# Patient Record
Sex: MALE | Race: WHITE | Employment: UNEMPLOYED | URBAN - METROPOLITAN AREA
[De-identification: names, ages, dates, MRNs, and addresses within clinical notes are randomized per-mention and may not be internally consistent; named-entity substitution may affect disease eponyms.]

---

## 2018-12-04 ENCOUNTER — HOSPITAL ENCOUNTER (OUTPATIENT)
Dept: SLEEP CENTER | Age: 53
Discharge: HOME | End: 2018-12-04
Payer: COMMERCIAL

## 2018-12-04 PROCEDURE — 95811 POLYSOM 6/>YRS CPAP 4/> PARM: CPT

## 2018-12-04 PROCEDURE — 2700000000 HC OXYGEN THERAPY PER DAY

## 2020-01-08 RX ORDER — POTASSIUM CHLORIDE 750 MG/1
20 CAPSULE, EXTENDED RELEASE ORAL 2 TIMES DAILY
Qty: 180 CAPSULE | Refills: 3 | Status: SHIPPED | OUTPATIENT
Start: 2020-01-08 | End: 2020-01-09

## 2020-01-08 RX ORDER — METOLAZONE 2.5 MG/1
2.5 TABLET ORAL DAILY
Qty: 90 TABLET | Refills: 3 | Status: SHIPPED
Start: 2020-01-08 | End: 2021-03-12

## 2020-01-08 RX ORDER — ATORVASTATIN CALCIUM 40 MG/1
40 TABLET, FILM COATED ORAL NIGHTLY
Qty: 90 TABLET | Refills: 3 | Status: SHIPPED
Start: 2020-01-08 | End: 2021-02-17 | Stop reason: SDUPTHER

## 2020-01-08 RX ORDER — AMLODIPINE BESYLATE 10 MG/1
10 TABLET ORAL DAILY
Qty: 90 TABLET | Refills: 3 | Status: SHIPPED
Start: 2020-01-08 | End: 2021-02-17 | Stop reason: SDUPTHER

## 2020-01-08 RX ORDER — METOPROLOL TARTRATE 100 MG/1
100 TABLET ORAL 3 TIMES DAILY
Qty: 270 TABLET | Refills: 3 | Status: SHIPPED
Start: 2020-01-08 | End: 2021-06-10 | Stop reason: SDUPTHER

## 2020-01-09 RX ORDER — POTASSIUM CHLORIDE 750 MG/1
10 CAPSULE, EXTENDED RELEASE ORAL 2 TIMES DAILY
COMMUNITY
End: 2020-01-09 | Stop reason: SDUPTHER

## 2020-01-09 RX ORDER — POTASSIUM CHLORIDE 750 MG/1
10 CAPSULE, EXTENDED RELEASE ORAL 2 TIMES DAILY
Qty: 180 CAPSULE | Refills: 3 | Status: SHIPPED
Start: 2020-01-09 | End: 2021-02-17 | Stop reason: SDUPTHER

## 2020-02-07 VITALS
DIASTOLIC BLOOD PRESSURE: 88 MMHG | SYSTOLIC BLOOD PRESSURE: 142 MMHG | WEIGHT: 289 LBS | HEART RATE: 64 BPM | BODY MASS INDEX: 43.8 KG/M2 | HEIGHT: 68 IN

## 2020-04-23 ENCOUNTER — TELEPHONE (OUTPATIENT)
Dept: CARDIOLOGY CLINIC | Age: 55
End: 2020-04-23

## 2020-11-17 ENCOUNTER — TELEPHONE (OUTPATIENT)
Dept: CARDIOLOGY CLINIC | Age: 55
End: 2020-11-17

## 2020-11-17 RX ORDER — ISOSORBIDE MONONITRATE 30 MG/1
30 TABLET, EXTENDED RELEASE ORAL DAILY
Qty: 90 TABLET | Refills: 3 | Status: SHIPPED
Start: 2020-11-17 | End: 2021-03-12

## 2020-11-17 NOTE — TELEPHONE ENCOUNTER
Patient called in stating his blood pressure usually runs 130/75 it is now running 155/80 with some chest discomfort - he is under some stress and doesn't know if that's what's causing it. He has been going back and forth to [place] where his son got , but since pandemic has not been to his physicians. I explained that I do not have an opening until Luna but would put him on a cancellation list if he wanted. He states he will see what happens over the next few days.   If he feels he needs an appointment, he will call

## 2021-02-17 RX ORDER — ATORVASTATIN CALCIUM 40 MG/1
40 TABLET, FILM COATED ORAL NIGHTLY
Qty: 30 TABLET | Refills: 0 | Status: SHIPPED
Start: 2021-02-17 | End: 2021-03-22 | Stop reason: SDUPTHER

## 2021-02-17 RX ORDER — POTASSIUM CHLORIDE 750 MG/1
10 CAPSULE, EXTENDED RELEASE ORAL 2 TIMES DAILY
Qty: 60 CAPSULE | Refills: 0 | Status: SHIPPED
Start: 2021-02-17 | End: 2021-03-22 | Stop reason: SDUPTHER

## 2021-02-17 RX ORDER — AMLODIPINE BESYLATE 10 MG/1
10 TABLET ORAL DAILY
Qty: 30 TABLET | Refills: 0 | Status: SHIPPED
Start: 2021-02-17 | End: 2021-03-22 | Stop reason: SDUPTHER

## 2021-03-04 ENCOUNTER — HOSPITAL ENCOUNTER (OUTPATIENT)
Age: 56
Discharge: HOME | End: 2021-03-04
Payer: COMMERCIAL

## 2021-03-04 PROCEDURE — 36415 COLL VENOUS BLD VENIPUNCTURE: CPT

## 2021-03-04 PROCEDURE — 84153 ASSAY OF PSA TOTAL: CPT

## 2021-03-04 PROCEDURE — G0103 PSA SCREENING: HCPCS

## 2021-03-08 LAB
PROSTATE SPECIFIC ANTIGEN: 1.33 NG/ML (ref 0–4)
PROSTATE SPECIFIC ANTIGEN: ABNORMAL NG/ML (ref 0–4)

## 2021-03-12 ENCOUNTER — OFFICE VISIT (OUTPATIENT)
Dept: CARDIOLOGY CLINIC | Age: 56
End: 2021-03-12
Payer: COMMERCIAL

## 2021-03-12 VITALS
BODY MASS INDEX: 40.16 KG/M2 | HEIGHT: 68 IN | SYSTOLIC BLOOD PRESSURE: 110 MMHG | HEART RATE: 61 BPM | WEIGHT: 265 LBS | DIASTOLIC BLOOD PRESSURE: 60 MMHG

## 2021-03-12 DIAGNOSIS — I50.9 CONGESTIVE HEART FAILURE, UNSPECIFIED HF CHRONICITY, UNSPECIFIED HEART FAILURE TYPE (HCC): Primary | ICD-10-CM

## 2021-03-12 PROCEDURE — G8427 DOCREV CUR MEDS BY ELIG CLIN: HCPCS | Performed by: INTERNAL MEDICINE

## 2021-03-12 PROCEDURE — G8484 FLU IMMUNIZE NO ADMIN: HCPCS | Performed by: INTERNAL MEDICINE

## 2021-03-12 PROCEDURE — 99214 OFFICE O/P EST MOD 30 MIN: CPT | Performed by: INTERNAL MEDICINE

## 2021-03-12 PROCEDURE — 93000 ELECTROCARDIOGRAM COMPLETE: CPT | Performed by: INTERNAL MEDICINE

## 2021-03-12 PROCEDURE — G8417 CALC BMI ABV UP PARAM F/U: HCPCS | Performed by: INTERNAL MEDICINE

## 2021-03-12 PROCEDURE — 3017F COLORECTAL CA SCREEN DOC REV: CPT | Performed by: INTERNAL MEDICINE

## 2021-03-12 PROCEDURE — 1036F TOBACCO NON-USER: CPT | Performed by: INTERNAL MEDICINE

## 2021-03-12 NOTE — PROGRESS NOTES
[facility] Progress Note  Dr. Josh Pina      Referring Physician: Arturo Mendoza DO  CHIEF COMPLAINT:   Chief Complaint   Patient presents with    Congestive Heart Failure     Annual. Pt has no cardiac complaints today       HISTORY OF PRESENT ILLNESS:   60year old male with history of CAD, CKD, CHF, hypertension and hyperlipidemia is here for a follow up visit. Patient denies any chest pain, no shortness of breath, no lightheadedness, no dizziness, no palpitations, no pedal edema, no PND, no orthopnea, no syncope, no presyncopal episodes. Past Medical History:   Diagnosis Date    Atrial fibrillation ([address]  11-13-14    post ACB    CAD (coronary artery disease)     Carotid artery stenosis     Chronic kidney disease     Diastolic CHF ([address] 07/63/71    Echo 10/17/14 EF 20% stage I diastolic, 84/1/26  EF 45% with stage II diastolic chf    Hyperlipidemia     Hypertension     Obesity          Past Surgical History:   Procedure Laterality Date    CARDIAC CATHETERIZATION  10/31/14    severe triple vessel disease    CARDIAC SURGERY      stent x 2  in 2006 [facility]    CORONARY ANGIOPLASTY      CORONARY ARTERY BYPASS GRAFT  11-5-14    x 5    DIAGNOSTIC CARDIAC CATH LAB PROCEDURE  3/21/07    [facility]: Abnormal LV status, global hypokinesis, EF 50%. No AS, no MR. Left main luminal irregular. LAD prox 40-50% stenosis. Diag 1 prox 100% stenosis fills via collaterals from LAD. LCX prox 100% stenosis. Fills via collaterals from RCA. RCA distal 90% stenosis. PDA-right luminal irregular. RPLS prox 90% stenosis. Double vessel disease, severe. LV dysfunction mild.     DIAGNOSTIC CARDIAC CATH LAB PROCEDURE  3/22/07    stent x2, [facility]: Successful PCI of RCA, native vessel   [address]    left negative tumor         Current Outpatient Medications   Medication Sig Dispense Refill    metoprolol (LOPRESSOR) 100 MG tablet Take 1 tablet by mouth 3 times daily 270 tablet 3    aspirin 81 MG tablet Take 81 mg by mouth daily.  amLODIPine (NORVASC) 10 MG tablet Take 1 tablet by mouth daily 90 tablet 3    atorvastatin (LIPITOR) 40 MG tablet Take 1 tablet by mouth nightly 90 tablet 3    potassium chloride (MICRO-K) 10 MEQ extended release capsule Take 1 capsule by mouth 2 times daily 180 capsule 3     No current facility-administered medications for this visit.           Allergies as of 03/12/2021 - Review Complete 03/12/2021   Allergen Reaction Noted    Penicillins Anaphylaxis 04/22/2013       Social History     Socioeconomic History    Marital status:      Spouse name: Not on file    Number of children: Not on file    Years of education: Not on file    Highest education level: Not on file   Occupational History    Not on file   Social Needs    Financial resource strain: Not on file    Food insecurity     Worry: Not on file     Inability: Not on file    Transportation needs     Medical: Not on file     Non-medical: Not on file   Tobacco Use    Smoking status: Never Smoker    Smokeless tobacco: Never Used   Substance and Sexual Activity    Alcohol use: No     Comment: once or twice a month     Drug use: No    Sexual activity: Not on file   Lifestyle    Physical activity     Days per week: Not on file     Minutes per session: Not on file    Stress: Not on file   Relationships    Social connections     Talks on phone: Not on file     Gets together: Not on file     Attends Restoration service: Not on file     Active member of club or organization: Not on file     Attends meetings of clubs or organizations: Not on file     Relationship status: Not on file    Intimate partner violence     Fear of current or ex partner: Not on file     Emotionally abused: Not on file     Physically abused: Not on file     Forced sexual activity: Not on file   Other Topics Concern    Not on file   Social History Narrative    Not on file       Family History Problem Relation Age of Onset    Heart Surgery Father        REVIEW OF SYSTEMS:     CONSTITUTIONAL:  negative for  fevers, chills, sweats and fatigue  HEENT:  negative for  tinnitus, earaches, nasal congestion and epistaxis  RESPIRATORY:  negative for  dry cough, cough with sputum, dyspnea, wheezing and hemoptysis  GASTROINTESTINAL:  negative for nausea, vomiting, diarrhea, constipation, pruritus and jaundice  HEMATOLOGIC/LYMPHATIC:  negative for easy bruising, bleeding, lymphadenopathy and petechiae  ENDOCRINE:  negative for heat intolerance, cold intolerance, tremor, hair loss and diabetic symptoms including neither polyuria nor polydipsia nor blurred vision  MUSCULOSKELETAL:  negative for  myalgias, arthralgias, joint swelling, stiff joints and decreased range of motion  NEUROLOGICAL:  negative for memory problems, speech problems, visual disturbance, dysphagia, weakness and numbness      PHYSICAL EXAM:   CONSTITUTIONAL: awake, alert, cooperative, no apparent distress, and appears stated age  HEAD: normocepalic, without obvious abnormality, atraumatic  NECK: Supple, symmetrical, trachea midline, no adenopathy, thyroid symmetric  LUNGS: No increased work of breathing, good air exchange, no rales no wheezing. CARDIOVASCULAR: Normal apical impulse, regular rate and rhythm, normal S1 and S2, no S3 or S4, and no murmur noted and no JVD, no carotid bruit. + 1 pitting edema up to his thighs right more than left. ABDOMEN: Soft, nontender, no masses, no organomegaly, BS+  MUSCULOSKELETAL: No clubbing no cyanosis. there is no redness, warmth, or swelling of the joints.   NEUROLOGIC: Alert, awake,oriented x3        /60 (Site: Right Upper Arm, Position: Sitting, Cuff Size: Large Adult)   Pulse 61   Ht 5' 8\" (1.727 m)   Wt 265 lb (120.2 kg)   BMI 40.29 kg/m²     DATA:   I personally reviewed the visit EKG with the following interpretation: Rhythm, nonspecific T wave changes in the lateral leads, poor R wave progression and first-degree AV block    EKG 11/14/19 Sinus rhythm with 1st degree AV block    ECHO: 5/11/15 Summary   Compared to prior echo, no changes noted. Technically adequate study. Mild asymmetric left ventricular hypertrophy   Ejection fraction is visually estimated at 50%. Mild global wall hypokinesis. There is doppler evidence of stage III diastolic dysfunction. Right ventricle global systolic function is mildly reduced . Mild tricuspid regurgitation. RVSP was not measured. Stress Test:     Angiography: 10/31/14 IMPRESSION:  Severe triple-vessel disease. Cardiology Labs: BMP:    Lab Results   Component Value Date     10/30/2017    K 3.6 10/30/2017     10/30/2017    CO2 30 10/30/2017    BUN 19 10/30/2017    CREATININE 1.4 10/30/2017     CMP:    Lab Results   Component Value Date     10/30/2017    K 3.6 10/30/2017     10/30/2017    CO2 30 10/30/2017    BUN 19 10/30/2017    CREATININE 1.4 10/30/2017    PROT 6.8 03/20/2015     CBC:    Lab Results   Component Value Date    WBC 10.4 03/19/2015    RBC 5.19 03/19/2015    HGB 13.2 03/19/2015    HCT 41.8 03/19/2015    MCV 80.5 03/19/2015    RDW 16.5 03/19/2015     03/19/2015     PT/INR:  No results found for: PTINR  PT/INR Warfarin:  No components found for: PTPATWAR, PTINRWAR  PTT:    Lab Results   Component Value Date    APTT 28.2 11/05/2014     PTT Heparin:  No components found for: APTTHEP  Magnesium:    Lab Results   Component Value Date    MG 2.4 11/17/2014     TSH:  No results found for: TSH  TROPONIN:  No components found for: TROP  BNP:    Lab Results   Component Value Date    BNP 1,384 11/24/2014     FASTING LIPID PANEL:    Lab Results   Component Value Date    CHOL 128 10/29/2014    HDL 35 10/29/2014    TRIG 131 10/29/2014     No orders to display     I have personally reviewed the laboratory, cardiac diagnostic and radiographic testing as outlined above:      IMPRESSION:  1.   Chronic diastolic congestive heart failure  2. Hypertension: Controlled  3. Coronary artery disease status post CABG:left internal mammary artery to the LAD, saphenous vein graft to the diagonal branch of the LAD, saphenous vein graft to the posterolateral with a Y graft to the PDA, and the left radial artery to the obtuse marginal, stable would continue current treatment  4. Status post remote PCI to RCA and LAD  5.stage III chronic kidney disease  6. abnormal EKG with chronic T wave changes in the lateral leads    RECOMMENDATIONS:   1. Continue current treatment  2. CHF: Daily weight, take an extra Lasix for weight gain of more than 2-3 pounds in 24 hours, compliance with diuretics, low-salt diet were all advised. 3.  Follow-up with Dr. Melida Castillo as scheduled  4. Follow-up with Dr. Nanette Humphries in 1 year, sooner if symptomatic for any reason  I have reviewed my findings and recommendations with patient    Electronically signed by Marybel Rodriguez MD on 4/12/2021 at 8:56 PM    NOTE: This report was transcribed using voice recognition software.  Every effort was made to ensure accuracy; however, inadvertent computerized transcription errors may be present

## 2021-03-22 RX ORDER — ATORVASTATIN CALCIUM 40 MG/1
40 TABLET, FILM COATED ORAL NIGHTLY
Qty: 90 TABLET | Refills: 3 | Status: SHIPPED
Start: 2021-03-22 | End: 2021-03-31 | Stop reason: SDUPTHER

## 2021-03-22 RX ORDER — AMLODIPINE BESYLATE 10 MG/1
10 TABLET ORAL DAILY
Qty: 90 TABLET | Refills: 3 | Status: SHIPPED
Start: 2021-03-22 | End: 2021-03-31 | Stop reason: SDUPTHER

## 2021-03-22 RX ORDER — POTASSIUM CHLORIDE 750 MG/1
10 CAPSULE, EXTENDED RELEASE ORAL 2 TIMES DAILY
Qty: 180 CAPSULE | Refills: 3 | Status: SHIPPED
Start: 2021-03-22 | End: 2021-03-31 | Stop reason: SDUPTHER

## 2021-03-31 RX ORDER — POTASSIUM CHLORIDE 750 MG/1
10 CAPSULE, EXTENDED RELEASE ORAL 2 TIMES DAILY
Qty: 180 CAPSULE | Refills: 3 | Status: SHIPPED
Start: 2021-03-31 | End: 2022-06-13

## 2021-03-31 RX ORDER — AMLODIPINE BESYLATE 10 MG/1
10 TABLET ORAL DAILY
Qty: 90 TABLET | Refills: 3 | Status: SHIPPED
Start: 2021-03-31 | End: 2022-06-13

## 2021-03-31 RX ORDER — ATORVASTATIN CALCIUM 40 MG/1
40 TABLET, FILM COATED ORAL NIGHTLY
Qty: 90 TABLET | Refills: 3 | Status: SHIPPED
Start: 2021-03-31 | End: 2022-06-13

## 2021-06-10 RX ORDER — METOPROLOL TARTRATE 100 MG/1
100 TABLET ORAL 3 TIMES DAILY
Qty: 270 TABLET | Refills: 3 | Status: SHIPPED
Start: 2021-06-10 | End: 2022-06-13

## 2021-08-30 ENCOUNTER — TELEPHONE (OUTPATIENT)
Dept: CARDIOLOGY CLINIC | Age: 56
End: 2021-08-30

## 2022-05-17 NOTE — PROGRESS NOTES
[facility] Cardiology Progress Note  Dr. Yvette Nunez      Referring Physician: Charanjit Dickey DO  CHIEF COMPLAINT:   Chief Complaint   Patient presents with    Coronary Artery Disease     Annual, Patient has no complaints        HISTORY OF PRESENT ILLNESS:   60year old male with history of CAD, CKD, CHF, hypertension and hyperlipidemia is here for a follow up visit. Patient denies any chest pain, no shortness of breath, no lightheadedness, no dizziness, no palpitations, no pedal edema, no PND, no orthopnea, no syncope, no presyncopal episodes. Past Medical History:   Diagnosis Date    Atrial fibrillation ([address]  11-13-14    post ACB    CAD (coronary artery disease)     Carotid artery stenosis     Chronic kidney disease     Diastolic CHF ([address] 73/07/54    Echo 10/17/14 EF 33% stage I diastolic, 76/7/69  EF 49% with stage II diastolic chf    Hyperlipidemia     Hypertension     Obesity          Past Surgical History:   Procedure Laterality Date    CARDIAC CATHETERIZATION  10/31/14    severe triple vessel disease    CARDIAC SURGERY      stent x 2  in 2006 [facility]    CORONARY ANGIOPLASTY      CORONARY ARTERY BYPASS GRAFT  11-5-14    x 5    DIAGNOSTIC CARDIAC CATH LAB PROCEDURE  3/21/07    [facility]: Abnormal LV status, global hypokinesis, EF 50%. No AS, no MR. Left main luminal irregular. LAD prox 40-50% stenosis. Diag 1 prox 100% stenosis fills via collaterals from LAD. LCX prox 100% stenosis. Fills via collaterals from RCA. RCA distal 90% stenosis. PDA-right luminal irregular. RPLS prox 90% stenosis. Double vessel disease, severe. LV dysfunction mild.  DIAGNOSTIC CARDIAC CATH LAB PROCEDURE  3/22/07    stent x2, [facility]: Successful PCI of RCA, native vessel    ELBOW SURGERY  1969    left negative tumor         Current Outpatient Medications   Medication Sig Dispense Refill    aspirin 81 MG tablet Take 81 mg by mouth daily.       metoprolol (LOPRESSOR) 100 MG tablet Take 1 tablet by mouth 3 times daily (Patient not taking: Reported on 5/18/2022) 270 tablet 3    amLODIPine (NORVASC) 10 MG tablet Take 1 tablet by mouth daily (Patient not taking: Reported on 5/18/2022) 90 tablet 3    atorvastatin (LIPITOR) 40 MG tablet Take 1 tablet by mouth nightly (Patient not taking: Reported on 5/18/2022) 90 tablet 3    potassium chloride (MICRO-K) 10 MEQ extended release capsule Take 1 capsule by mouth 2 times daily (Patient not taking: Reported on 5/18/2022) 180 capsule 3     No current facility-administered medications for this visit. Allergies as of 05/18/2022 - Fully Reviewed 05/18/2022   Allergen Reaction Noted    Penicillins Anaphylaxis 04/22/2013       Social History     Socioeconomic History    Marital status:      Spouse name: Not on file    Number of children: Not on file    Years of education: Not on file    Highest education level: Not on file   Occupational History    Not on file   Tobacco Use    Smoking status: Never Smoker    Smokeless tobacco: Never Used   Vaping Use    Vaping Use: Never used   Substance and Sexual Activity    Alcohol use: No     Comment: once or twice a month     Drug use: No    Sexual activity: Not on file   Other Topics Concern    Not on file   Social History Narrative    Not on file     Social Determinants of Health     Financial Resource Strain:     Difficulty of Paying Living Expenses: Not on file   Food Insecurity:     Worried About [address] in the Last Year: Not on file    Nicole of Food in the Last Year: Not on file   Transportation Needs:     Lack of Transportation (Medical): Not on file    Lack of Transportation (Non-Medical):  Not on file   Physical Activity:     Days of Exercise per Week: Not on file    Minutes of Exercise per Session: Not on file   Stress:     Feeling of Stress : Not on file   Social Connections:     Frequency of Communication with Friends and Family: Not on file  Frequency of Social Gatherings with Friends and Family: Not on file    Attends Adventism Services: Not on file    Active Member of Clubs or Organizations: Not on file    Attends Club or Organization Meetings: Not on file    Marital Status: Not on file   Intimate Partner Violence:     Fear of Current or Ex-Partner: Not on file    Emotionally Abused: Not on file    Physically Abused: Not on file    Sexually Abused: Not on file   Housing Stability:     Unable to Pay for Housing in the Last Year: Not on file    Number of [place] in the Last Year: Not on file    Unstable Housing in the Last Year: Not on file       Family History   Problem Relation Age of Onset    Heart Surgery Father        REVIEW OF SYSTEMS:     CONSTITUTIONAL:  negative for  fevers, chills, sweats and fatigue  HEENT:  negative for  tinnitus, earaches, nasal congestion and epistaxis  RESPIRATORY:  negative for  dry cough, cough with sputum, dyspnea, wheezing and hemoptysis  GASTROINTESTINAL:  negative for nausea, vomiting, diarrhea, constipation, pruritus and jaundice  HEMATOLOGIC/LYMPHATIC:  negative for easy bruising, bleeding, lymphadenopathy and petechiae  ENDOCRINE:  negative for heat intolerance, cold intolerance, tremor, hair loss and diabetic symptoms including neither polyuria nor polydipsia nor blurred vision  MUSCULOSKELETAL:  negative for  myalgias, arthralgias, joint swelling, stiff joints and decreased range of motion  NEUROLOGICAL:  negative for memory problems, speech problems, visual disturbance, dysphagia, weakness and numbness      PHYSICAL EXAM:   CONSTITUTIONAL: awake, alert, cooperative, no apparent distress, and appears stated age  HEAD: normocepalic, without obvious abnormality, atraumatic  NECK: Supple, symmetrical, trachea midline, no adenopathy, thyroid symmetric  LUNGS: No increased work of breathing, good air exchange, no rales no wheezing.   CARDIOVASCULAR: Normal apical impulse, regular rate and rhythm, normal S1 and S2, no S3 or S4, and no murmur noted and no JVD, no carotid bruit. + 1 pitting edema up to his thighs right more than left. ABDOMEN: Soft, nontender, no masses, no organomegaly, BS+  MUSCULOSKELETAL: No clubbing no cyanosis. there is no redness, warmth, or swelling of the joints. NEUROLOGIC: Alert, awake,oriented x3        BP (!) 130/98   Pulse 58   Resp 16   Ht 5' 9\" (1.753 m)   Wt 231 lb (104.8 kg)   BMI 34.11 kg/m²     DATA:   I personally reviewed the visit EKG with the following interpretation: Atrial fibrillation, slow ventricular response, incomplete right bundle branch block    EKG 3/12/21  Rhythm, nonspecific T wave changes in the lateral leads, poor R wave progression and first-degree AV block    ECHO: 5/11/15 Summary   Compared to prior echo, no changes noted. Technically adequate study. Mild asymmetric left ventricular hypertrophy   Ejection fraction is visually estimated at 50%. Mild global wall hypokinesis. There is doppler evidence of stage III diastolic dysfunction. Right ventricle global systolic function is mildly reduced . Mild tricuspid regurgitation. RVSP was not measured. Stress Test:     Angiography: 10/31/14 IMPRESSION:  Severe triple-vessel disease.        Cardiology Labs: BMP:    Lab Results   Component Value Date     10/30/2017    K 3.6 10/30/2017     10/30/2017    CO2 30 10/30/2017    BUN 19 10/30/2017    CREATININE 1.4 10/30/2017     CMP:    Lab Results   Component Value Date     10/30/2017    K 3.6 10/30/2017     10/30/2017    CO2 30 10/30/2017    BUN 19 10/30/2017    CREATININE 1.4 10/30/2017    PROT 6.8 03/20/2015     CBC:    Lab Results   Component Value Date    WBC 10.4 03/19/2015    RBC 5.19 03/19/2015    HGB 13.2 03/19/2015    HCT 41.8 03/19/2015    MCV 80.5 03/19/2015    RDW 16.5 03/19/2015     03/19/2015     PT/INR:  No results found for: PTINR  PT/INR Warfarin:  No components found for: PTPATWAR, PTINRWAR  PTT: Lab Results   Component Value Date    APTT 28.2 11/05/2014     PTT Heparin:  No components found for: APTTHEP  Magnesium:    Lab Results   Component Value Date    MG 2.4 11/17/2014     TSH:  No results found for: TSH  TROPONIN:  No components found for: TROP  BNP:    Lab Results   Component Value Date    BNP 1,384 11/24/2014     FASTING LIPID PANEL:    Lab Results   Component Value Date    CHOL 128 10/29/2014    HDL 35 10/29/2014    TRIG 131 10/29/2014     No orders to display     I have personally reviewed the laboratory, cardiac diagnostic and radiographic testing as outlined above:      IMPRESSION:  1. Atrial fibrillation: Newly diagnosed, controlled ventricular response, [id number] score of 3, will benefit from chronic anticoagulation, will start Eliquis 5 mg 1 by mouth twice daily, will hold on starting the Eliquis for now due to recent right arm trauma and headache  2. Chronic diastolic congestive heart failure  3. Hypertension: Controlled  4. Coronary artery disease status post CABG:left internal mammary artery to the LAD, saphenous vein graft to the diagonal branch of the LAD, saphenous vein graft to the posterolateral with a Y graft to the PDA, and the left radial artery to the obtuse marginal, s/p remote PCI to RCA and LAD, doing fine, will continue current treatment. 5.stage III chronic kidney disease  6. abnormal EKG with chronic T wave changes in the lateral leads    RECOMMENDATIONS:   1. Preventive cardiology: Low-salt, low-cholesterol diet, daily exercise, total cholesterol of less than 200, LDL of less than 70, were all advised. 2.  Eliquis 5 mg 1 by mouth twice daily when right arm swelling resolved  3. Will discontinue aspirin when we start Eliquis  4. CHF: Daily weight, take an extra Lasix for weight gain of more than 2-3 pounds in 24 hours, compliance with diuretics, low-salt diet were all advised. 5.  Follow-up with Dr. Clara Pacheco as scheduled  6.   Follow-up with Dr. Renae Abraham in 1 year, sooner if symptomatic for any reason  I have reviewed my findings and recommendations with patient    Electronically signed by Aury Gale MD on 5/18/2022 at 10:16 AM    NOTE: This report was transcribed using voice recognition software.  Every effort was made to ensure accuracy; however, inadvertent computerized transcription errors may be present

## 2022-05-18 ENCOUNTER — OFFICE VISIT (OUTPATIENT)
Dept: CARDIOLOGY CLINIC | Age: 57
End: 2022-05-18
Payer: COMMERCIAL

## 2022-05-18 VITALS
SYSTOLIC BLOOD PRESSURE: 130 MMHG | DIASTOLIC BLOOD PRESSURE: 98 MMHG | RESPIRATION RATE: 16 BRPM | WEIGHT: 231 LBS | HEART RATE: 58 BPM | BODY MASS INDEX: 34.21 KG/M2 | HEIGHT: 69 IN

## 2022-05-18 DIAGNOSIS — I50.9 CONGESTIVE HEART FAILURE, UNSPECIFIED HF CHRONICITY, UNSPECIFIED HEART FAILURE TYPE (HCC): Primary | ICD-10-CM

## 2022-05-18 PROCEDURE — 99214 OFFICE O/P EST MOD 30 MIN: CPT | Performed by: INTERNAL MEDICINE

## 2022-05-18 PROCEDURE — 93000 ELECTROCARDIOGRAM COMPLETE: CPT | Performed by: INTERNAL MEDICINE

## 2022-05-25 ENCOUNTER — TELEPHONE (OUTPATIENT)
Dept: CARDIOLOGY CLINIC | Age: 57
End: 2022-05-25

## 2022-05-25 NOTE — TELEPHONE ENCOUNTER
Patient is going to be starting on his Eliquis but has questions:    He is taking aspirin - is he to continue?     He walks 3-4 miles daily and lifts weights asking if it's ok to continue    He is going to possibly have his lens replaced in his eyes asking if that will be ok

## 2022-05-26 NOTE — TELEPHONE ENCOUNTER
Should continue baby aspirin with foodHe should continue to stay as active as he is right nowYes he can still have his surgery, however, Eliquis should be held 3 days prior to the procedure

## 2022-06-13 ENCOUNTER — TELEPHONE (OUTPATIENT)
Dept: CARDIOLOGY CLINIC | Age: 57
End: 2022-06-13

## 2022-06-13 NOTE — TELEPHONE ENCOUNTER
Patient called in stating that since he last saw you, he hasn't felt well.   He has developed swelling in his legs - they are double the size and is having dyspnea on exertion

## 2022-06-14 RX ORDER — POTASSIUM CHLORIDE 750 MG/1
10 TABLET, EXTENDED RELEASE ORAL 2 TIMES DAILY
Qty: 90 TABLET | Refills: 1 | Status: SHIPPED | OUTPATIENT
Start: 2022-06-14 | End: 2022-08-12 | Stop reason: ALTCHOICE

## 2022-06-14 RX ORDER — BUMETANIDE 1 MG/1
1 TABLET ORAL 2 TIMES DAILY
Qty: 90 TABLET | Refills: 1 | Status: SHIPPED | OUTPATIENT
Start: 2022-06-14 | End: 2022-08-12 | Stop reason: ALTCHOICE

## 2022-06-14 NOTE — TELEPHONE ENCOUNTER
I called and Bumex 1 mg by mouth twice daily with a potassium to be taken with Bumex until swelling and weight gain are back to normal

## 2022-06-14 NOTE — TELEPHONE ENCOUNTER
Patient is calling in - states he has gained 12 pounds over the past few days - is not on a diuretic

## 2022-06-17 ENCOUNTER — TELEPHONE (OUTPATIENT)
Dept: CARDIOLOGY CLINIC | Age: 57
End: 2022-06-17

## 2022-06-17 NOTE — TELEPHONE ENCOUNTER
Chris called. Was hit by a baseball and went to urgent care. His BP there was 205/130 (they told him he may be in AF).   [phone]

## 2022-06-20 ENCOUNTER — HOSPITAL ENCOUNTER (OUTPATIENT)
Age: 57
Discharge: HOME | End: 2022-06-20
Payer: COMMERCIAL

## 2022-06-20 ENCOUNTER — OFFICE VISIT (OUTPATIENT)
Dept: CARDIOLOGY CLINIC | Age: 57
End: 2022-06-20
Payer: COMMERCIAL

## 2022-06-20 VITALS
DIASTOLIC BLOOD PRESSURE: 98 MMHG | OXYGEN SATURATION: 96 % | BODY MASS INDEX: 36.85 KG/M2 | SYSTOLIC BLOOD PRESSURE: 140 MMHG | HEIGHT: 69 IN | WEIGHT: 248.8 LBS | HEART RATE: 80 BPM | RESPIRATION RATE: 18 BRPM

## 2022-06-20 DIAGNOSIS — I50.9 CONGESTIVE HEART FAILURE, UNSPECIFIED HF CHRONICITY, UNSPECIFIED HEART FAILURE TYPE (HCC): Primary | ICD-10-CM

## 2022-06-20 DIAGNOSIS — R06.02 SOB (SHORTNESS OF BREATH): Primary | ICD-10-CM

## 2022-06-20 DIAGNOSIS — I50.9 CONGESTIVE HEART FAILURE, UNSPECIFIED HF CHRONICITY, UNSPECIFIED HEART FAILURE TYPE (HCC): ICD-10-CM

## 2022-06-20 LAB
ANION GAP SERPL CALCULATED.3IONS-SCNC: 10 MMOL/L (ref 7–16)
BUN BLDV-MCNC: 30 MG/DL (ref 6–20)
CALCIUM SERPL-MCNC: 9.1 MG/DL (ref 8.6–10.2)
CHLORIDE BLD-SCNC: 102 MMOL/L (ref 98–107)
CO2: 33 MMOL/L (ref 22–29)
CREAT SERPL-MCNC: 1.8 MG/DL (ref 0.7–1.2)
GFR AFRICAN AMERICAN: 47
GFR NON-AFRICAN AMERICAN: 39 ML/MIN/1.73
GLUCOSE BLD-MCNC: 101 MG/DL (ref 74–99)
POTASSIUM SERPL-SCNC: 3.7 MMOL/L (ref 3.5–5)
PRO-BNP: 9425 PG/ML (ref 0–125)
SODIUM BLD-SCNC: 145 MMOL/L (ref 132–146)

## 2022-06-20 PROCEDURE — 80048 BASIC METABOLIC PNL TOTAL CA: CPT

## 2022-06-20 PROCEDURE — 83880 ASSAY OF NATRIURETIC PEPTIDE: CPT

## 2022-06-20 PROCEDURE — 93000 ELECTROCARDIOGRAM COMPLETE: CPT | Performed by: INTERNAL MEDICINE

## 2022-06-20 PROCEDURE — 36415 COLL VENOUS BLD VENIPUNCTURE: CPT

## 2022-06-20 PROCEDURE — 99215 OFFICE O/P EST HI 40 MIN: CPT | Performed by: INTERNAL MEDICINE

## 2022-06-20 RX ORDER — METOPROLOL TARTRATE 100 MG/1
100 TABLET ORAL DAILY
COMMUNITY
End: 2022-08-12 | Stop reason: ALTCHOICE

## 2022-06-20 RX ORDER — METOLAZONE 5 MG/1
5 TABLET ORAL DAILY
Qty: 30 TABLET | Refills: 0 | Status: SHIPPED
Start: 2022-06-20 | End: 2022-08-09 | Stop reason: SDUPTHER

## 2022-06-20 NOTE — PROGRESS NOTES
[facility] Cardiology Progress Note  Dr. Evan Do      Referring Physician: Ketan Dyer DO  CHIEF COMPLAINT:   Chief Complaint   Patient presents with    Congestive Heart Failure     ov- pt has complaints of chest discomfort, palpitations, sob, dizziness, and swelling in feet and legs       HISTORY OF PRESENT ILLNESS:   60year old male with history of CAD, atrial fibrillation, CKD, CHF, hypertension and hyperlipidemia is here for a follow up visit. Patient is complaining of worsening pedal edema, orthopnea, shortness of breath, easy fatigability, started 2 weeks ago, has been persistent, some improvement with the p.o. Bumex, no chest pain, no lightheadedness or dizziness, no syncope, no presyncopal episodes. Stated blood pressure has been out of control    Past Medical History:   Diagnosis Date    Atrial fibrillation ([facility] [address]  11-13-14    post ACB    CAD (coronary artery disease)     Carotid artery stenosis     Chronic kidney disease     Diastolic CHF ([facility] [address] 05/30[birth year]51    Echo 10/17/14 EF 21% stage I diastolic, 69/2/31  EF 25% with stage II diastolic chf    Hyperlipidemia     Hypertension     Obesity          Past Surgical History:   Procedure Laterality Date    CARDIAC CATHETERIZATION  10/31/14    severe triple vessel disease    CARDIAC SURGERY      stent x 2  in [address] [facility]    CORONARY ANGIOPLASTY      CORONARY ARTERY BYPASS GRAFT  11-5-14    x 5    DIAGNOSTIC CARDIAC CATH LAB PROCEDURE  3/21/07    [facility]: Abnormal LV status, global hypokinesis, EF 50%. No AS, no MR. Left main luminal irregular. LAD prox 40-50% stenosis. Diag 1 prox 100% stenosis fills via collaterals from LAD. LCX prox 100% stenosis. Fills via collaterals from RCA. RCA distal 90% stenosis. PDA-right luminal irregular. RPLS prox 90% stenosis. Double vessel disease, severe. LV dysfunction mild.     DIAGNOSTIC CARDIAC CATH LAB PROCEDURE  3/22/07    stent x2, [facility]: Successful PCI of RCA, native vessel    ELBOW SURGERY  1969    left negative tumor         Current Outpatient Medications   Medication Sig Dispense Refill    metoprolol (LOPRESSOR) 100 MG tablet Take 100 mg by mouth daily      bumetanide (BUMEX) 1 MG tablet Take 1 tablet by mouth 2 times daily 90 tablet 1    potassium chloride (KLOR-CON M) 10 MEQ extended release tablet Take 1 tablet by mouth 2 times daily With the Bumex 90 tablet 1    apixaban (ELIQUIS) 5 MG TABS tablet Take 1 tablet by mouth 2 times daily 180 tablet 3    aspirin 81 MG tablet Take 81 mg by mouth daily. No current facility-administered medications for this visit. Allergies as of 06/20/2022 - Fully Reviewed 06/20/2022   Allergen Reaction Noted    Penicillins Anaphylaxis 04/22/2013       Social History     Socioeconomic History    Marital status:      Spouse name: Not on file    Number of children: Not on file    Years of education: Not on file    Highest education level: Not on file   Occupational History    Not on file   Tobacco Use    Smoking status: Never Smoker    Smokeless tobacco: Never Used   Vaping Use    Vaping Use: Never used   Substance and Sexual Activity    Alcohol use: No     Comment: once or twice a month     Drug use: No    Sexual activity: Not on file   Other Topics Concern    Not on file   Social History Narrative    Not on file     Social Determinants of Health     Financial Resource Strain:     Difficulty of Paying Living Expenses: Not on file   Food Insecurity:     Worried About [address] in the Last Year: Not on file    Nicole of Food in the Last Year: Not on file   Transportation Needs:     Lack of Transportation (Medical): Not on file    Lack of Transportation (Non-Medical):  Not on file   Physical Activity:     Days of Exercise per Week: Not on file    Minutes of Exercise per Session: Not on file   Stress:     Feeling of Stress : Not on file   Social Connections:     Frequency of Communication with Friends and Family: Not on file    Frequency of Social Gatherings with Friends and Family: Not on file    Attends Rastafari Services: Not on file    Active Member of Clubs or Organizations: Not on file    Attends Club or Organization Meetings: Not on file    Marital Status: Not on file   Intimate Partner Violence:     Fear of Current or Ex-Partner: Not on file    Emotionally Abused: Not on file    Physically Abused: Not on file    Sexually Abused: Not on file   Housing Stability:     Unable to Pay for Housing in the Last Year: Not on file    Number of [place] in the Last Year: Not on file    Unstable Housing in the Last Year: Not on file       Family History   Problem Relation Age of Onset    Heart Surgery Father        REVIEW OF SYSTEMS:     CONSTITUTIONAL:  negative for  fevers, chills, sweats, + fatigue  HEENT:  negative for  tinnitus, earaches, nasal congestion and epistaxis  RESPIRATORY:  negative for  dry cough, cough with sputum, wheezing and hemoptysis  GASTROINTESTINAL:  negative for nausea, vomiting, diarrhea, constipation, pruritus and jaundice  HEMATOLOGIC/LYMPHATIC:  negative for easy bruising, bleeding, lymphadenopathy and petechiae  ENDOCRINE:  negative for heat intolerance, cold intolerance, tremor, hair loss and diabetic symptoms including neither polyuria nor polydipsia nor blurred vision  MUSCULOSKELETAL:  negative for  myalgias, arthralgias, joint swelling, stiff joints and decreased range of motion  NEUROLOGICAL:  negative for memory problems, speech problems, visual disturbance, dysphagia, weakness and numbness      PHYSICAL EXAM:   CONSTITUTIONAL: awake, alert, cooperative, no apparent distress, and appears stated age  HEAD: normocepalic, without obvious abnormality, atraumatic  NECK: Supple, symmetrical, trachea midline, no adenopathy, thyroid symmetric  LUNGS: No increased work of breathing, good air exchange, basilar rales  CARDIOVASCULAR: Normal apical impulse, irregularly irregular, normal S1 and S2, no S3 or S4, and no murmur noted and no JVD, no carotid bruit. +++ pitting edema up to his thighs right more than left. ABDOMEN: Soft, nontender, no masses, no organomegaly, BS+  MUSCULOSKELETAL: No clubbing no cyanosis. there is no redness, warmth, or swelling of the joints. NEUROLOGIC: Alert, awake,oriented x3    BP (!) 140/98   Pulse 80   Resp 18   Ht 5' 9\" (1.753 m)   Wt 248 lb 12.8 oz (112.9 kg)   SpO2 96%   BMI 36.74 kg/m²     DATA:   I personally reviewed the visit EKG with the following interpretation: Atrial fibrillation, controlled ventricular response, incomplete right bundle branch block, occasional PVCs versus aberrantly conducted beats, nonspecific T wave changes    EKG 5/21/2022, atrial fibrillation, slow ventricular response, incomplete right bundle branch block    EKG 3/12/21  Rhythm, nonspecific T wave changes in the lateral leads, poor R wave progression and first-degree AV block    ECHO: 5/11/15 Summary   Compared to prior echo, no changes noted. Technically adequate study. Mild asymmetric left ventricular hypertrophy   Ejection fraction is visually estimated at 50%. Mild global wall hypokinesis. There is doppler evidence of stage III diastolic dysfunction. Right ventricle global systolic function is mildly reduced . Mild tricuspid regurgitation. RVSP was not measured. Stress Test:     Angiography: 10/31/14 IMPRESSION:  Severe triple-vessel disease.        Cardiology Labs: BMP:    Lab Results   Component Value Date     10/30/2017    K 3.6 10/30/2017     10/30/2017    CO2 30 10/30/2017    BUN 19 10/30/2017    CREATININE 1.4 10/30/2017     CMP:    Lab Results   Component Value Date     10/30/2017    K 3.6 10/30/2017     10/30/2017    CO2 30 10/30/2017    BUN 19 10/30/2017    CREATININE 1.4 10/30/2017    PROT 6.8 03/20/2015     CBC:    Lab Results   Component Value Date    WBC 10.4 03/19/2015    RBC 5.19 03/19/2015    HGB 13.2 03/19/2015    HCT 41.8 03/19/2015    MCV 80.5 03/19/2015    RDW 16.5 03/19/2015     03/19/2015     PT/INR:  No results found for: PTINR  PT/INR Warfarin:  No components found for: PTPATWAR, PTINRWAR  PTT:    Lab Results   Component Value Date    APTT 28.2 11/05/2014     PTT Heparin:  No components found for: APTTHEP  Magnesium:    Lab Results   Component Value Date    MG 2.4 11/17/2014     TSH:  No results found for: TSH  TROPONIN:  No components found for: TROP  BNP:    Lab Results   Component Value Date    BNP 1,384 11/24/2014     FASTING LIPID PANEL:    Lab Results   Component Value Date    CHOL 128 10/29/2014    HDL 35 10/29/2014    TRIG 131 10/29/2014     No orders to display     I have personally reviewed the laboratory, cardiac diagnostic and radiographic testing as outlined above:      IMPRESSION:  1. Acute congestive heart failure: Acute on chronic, suspect diastolic, decompensated, started on p.o. Bumex several days ago with some improvement, will add metolazone, will check basic metabolic panel, BNP and echocardiogram.  2.  Atrial fibrillation: Persistent, newly diagnosed, controlled ventricular response, [id number] score of 3, under chronic anticoagulation with Eliquis 5 mg 1 by mouth twice daily. 3. Hypertension: Not well controlled  4. Coronary artery disease status post CABG:left internal mammary artery to the LAD, saphenous vein graft to the diagonal branch of the LAD, saphenous vein graft to the posterolateral with a Y graft to the PDA, and the left radial artery to the obtuse marginal, s/p remote PCI to RCA and LAD, doing fine, will continue current treatment. 5. stage III chronic kidney disease  6. abnormal EKG with chronic T wave changes in the lateral leads    RECOMMENDATIONS:   1. Basic metabolic panel, BNP  2. Metolazone 5 mg 1 by mouth daily  3.   Continue the rest of medications  4.  Echocardiogram to evaluate ejection fraction [facility] structural heart disease that might explain his symptoms  5. Increase risk of bleeding due to being on anti-coagulation, symptoms and signs of bleeding discussed with patient, patient was advised to seek medical attention at the earliest symptoms or signs of bleeding. 6.  CHF: Daily weight, take an extra Bumex for weight gain of more than 2-3 pounds in 24 hours, compliance with diuretics, low-salt diet were all advised. 7.  Follow-up with Dr. Keisha Awan as scheduled  8. Follow-up with Dr. Niels Pa after his tests  I have reviewed my findings and recommendations with patient    Electronically signed by Doreen Anderson MD on 6/20/2022 at 12:58 PM    NOTE: This report was transcribed using voice recognition software.  Every effort was made to ensure accuracy; however, inadvertent computerized transcription errors may be present

## 2022-06-22 ENCOUNTER — TELEPHONE (OUTPATIENT)
Dept: CARDIOLOGY CLINIC | Age: 57
End: 2022-06-22

## 2022-06-22 NOTE — TELEPHONE ENCOUNTER
Feels a lot better, water is going away has lost 10 pounds. He is asking what you are wanting him to do with his medication.   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

## 2022-06-24 DIAGNOSIS — I50.33 ACUTE ON CHRONIC DIASTOLIC (CONGESTIVE) HEART FAILURE (HCC): Primary | ICD-10-CM

## 2022-06-28 NOTE — TELEPHONE ENCOUNTER
Patient called in - is back to bumex 1 daily - feels good but is questioning why this happened. Everything was going good for him wanting to know what the next steps are.   He is scheduled for his echo on July 18

## 2022-06-28 NOTE — TELEPHONE ENCOUNTER
Unfortunately that is part of the congestive heart failure, and we might get some answers from the echocardiogram results

## 2022-07-18 ENCOUNTER — TELEPHONE (OUTPATIENT)
Dept: CARDIOLOGY | Age: 57
End: 2022-07-18

## 2022-07-19 ENCOUNTER — HOSPITAL ENCOUNTER (OUTPATIENT)
Dept: CARDIOLOGY | Age: 57
Discharge: HOME | End: 2022-07-19
Payer: COMMERCIAL

## 2022-07-19 DIAGNOSIS — R06.02 SOB (SHORTNESS OF BREATH): ICD-10-CM

## 2022-07-19 LAB
LV EF: 50 %
LVEF MODALITY: NORMAL

## 2022-07-19 PROCEDURE — 93306 TTE W/DOPPLER COMPLETE: CPT

## 2022-07-25 ENCOUNTER — TELEPHONE (OUTPATIENT)
Dept: CARDIOLOGY CLINIC | Age: 57
End: 2022-07-25

## 2022-07-26 NOTE — TELEPHONE ENCOUNTER
Mild tricuspid valve regurgitation, moderate tricuspid valve regurgitation, will continue current treatment for now, I will see as scheduled

## 2022-08-01 ENCOUNTER — TELEPHONE (OUTPATIENT)
Dept: CARDIOLOGY CLINIC | Age: 57
End: 2022-08-01

## 2022-08-01 NOTE — TELEPHONE ENCOUNTER
Patient called into office has complaints of edema in legs and abdomen. States that he has gained 20 pounds of water weight over the weekend. Per Dr. Loraine Odell patient is to take Bumex 2 mg BID while continue to monitor his weights and call back next Thursday. 8/11/2022.

## 2022-08-05 ENCOUNTER — HOSPITAL ENCOUNTER (OUTPATIENT)
Age: 57
Discharge: HOME | End: 2022-08-05
Payer: COMMERCIAL

## 2022-08-05 ENCOUNTER — HOSPITAL ENCOUNTER (OUTPATIENT)
Age: 57
Discharge: HOME | End: 2022-08-07
Payer: COMMERCIAL

## 2022-08-05 ENCOUNTER — HOSPITAL ENCOUNTER (OUTPATIENT)
Dept: GENERAL RADIOLOGY | Age: 57
Discharge: HOME | End: 2022-08-07
Payer: COMMERCIAL

## 2022-08-05 ENCOUNTER — OFFICE VISIT (OUTPATIENT)
Dept: CARDIOLOGY CLINIC | Age: 57
End: 2022-08-05
Payer: COMMERCIAL

## 2022-08-05 VITALS
RESPIRATION RATE: 16 BRPM | BODY MASS INDEX: 35.55 KG/M2 | WEIGHT: 240 LBS | HEART RATE: 88 BPM | DIASTOLIC BLOOD PRESSURE: 98 MMHG | HEIGHT: 69 IN | SYSTOLIC BLOOD PRESSURE: 162 MMHG

## 2022-08-05 DIAGNOSIS — R05.9 COUGH: ICD-10-CM

## 2022-08-05 DIAGNOSIS — R06.02 SOB (SHORTNESS OF BREATH): ICD-10-CM

## 2022-08-05 DIAGNOSIS — R06.02 SOB (SHORTNESS OF BREATH): Primary | ICD-10-CM

## 2022-08-05 DIAGNOSIS — R06.09 DOE (DYSPNEA ON EXERTION): Primary | ICD-10-CM

## 2022-08-05 DIAGNOSIS — I25.10 CORONARY ARTERY DISEASE WITHOUT ANGINA PECTORIS, UNSPECIFIED VESSEL OR LESION TYPE, UNSPECIFIED WHETHER NATIVE OR TRANSPLANTED HEART: ICD-10-CM

## 2022-08-05 DIAGNOSIS — R94.31 ABNORMAL EKG: ICD-10-CM

## 2022-08-05 DIAGNOSIS — Z95.1 STATUS POST CORONARY ARTERY BYPASS GRAFT: ICD-10-CM

## 2022-08-05 LAB
ANION GAP SERPL CALCULATED.3IONS-SCNC: 13 MMOL/L (ref 7–16)
BUN BLDV-MCNC: 28 MG/DL (ref 6–20)
CALCIUM SERPL-MCNC: 9.1 MG/DL (ref 8.6–10.2)
CHLORIDE BLD-SCNC: 101 MMOL/L (ref 98–107)
CO2: 27 MMOL/L (ref 22–29)
CREAT SERPL-MCNC: 1.8 MG/DL (ref 0.7–1.2)
GFR AFRICAN AMERICAN: 47
GFR NON-AFRICAN AMERICAN: 39 ML/MIN/1.73
GLUCOSE BLD-MCNC: 83 MG/DL (ref 74–99)
HCT VFR BLD CALC: 46.6 % (ref 37–54)
HEMOGLOBIN: 14 G/DL (ref 12.5–16.5)
MCH RBC QN AUTO: 25.8 PG (ref 26–35)
MCHC RBC AUTO-ENTMCNC: 30 % (ref 32–34.5)
MCV RBC AUTO: 85.8 FL (ref 80–99.9)
PDW BLD-RTO: 16 FL (ref 11.5–15)
PLATELET # BLD: 163 E9/L (ref 130–450)
PMV BLD AUTO: 9.8 FL (ref 7–12)
POTASSIUM SERPL-SCNC: 3.2 MMOL/L (ref 3.5–5)
PRO-BNP: 7118 PG/ML (ref 0–125)
RBC # BLD: 5.43 E12/L (ref 3.8–5.8)
SODIUM BLD-SCNC: 141 MMOL/L (ref 132–146)
TSH SERPL DL<=0.05 MIU/L-ACNC: 3.17 UIU/ML (ref 0.27–4.2)
WBC # BLD: 8.1 E9/L (ref 4.5–11.5)

## 2022-08-05 PROCEDURE — 36415 COLL VENOUS BLD VENIPUNCTURE: CPT

## 2022-08-05 PROCEDURE — 71046 X-RAY EXAM CHEST 2 VIEWS: CPT

## 2022-08-05 PROCEDURE — 83880 ASSAY OF NATRIURETIC PEPTIDE: CPT

## 2022-08-05 PROCEDURE — 99215 OFFICE O/P EST HI 40 MIN: CPT | Performed by: INTERNAL MEDICINE

## 2022-08-05 PROCEDURE — 93000 ELECTROCARDIOGRAM COMPLETE: CPT | Performed by: INTERNAL MEDICINE

## 2022-08-05 PROCEDURE — 80048 BASIC METABOLIC PNL TOTAL CA: CPT

## 2022-08-05 PROCEDURE — 85027 COMPLETE CBC AUTOMATED: CPT

## 2022-08-05 PROCEDURE — 84443 ASSAY THYROID STIM HORMONE: CPT

## 2022-08-05 NOTE — PROGRESS NOTES
[facility] Cardiology Progress Note  Dr. Radha Nix      Referring Physician: Monique Whitt DO  CHIEF COMPLAINT:   Chief Complaint   Patient presents with    Congestive Heart Failure     Swelling       HISTORY OF PRESENT ILLNESS:   60year old male with history of CAD, atrial fibrillation, CKD, CHF, hypertension and hyperlipidemia is here for a follow up visit. Patient was seen in June for acute exacerbation of congestive heart failure, got better then got worse, is here for evaluation, still complaining of dyspnea on exertion, pedal edema, no PND, no orthopnea, occasional cough, denies any chest pain, no palpitations, no lightheadedness or dizziness, no syncope, no presyncopal episodes. Blood pressure has not been well controlled    Past Medical History:   Diagnosis Date    Atrial fibrillation ([facility] [address]  11-13-14    post ACB    CAD (coronary artery disease)     Carotid artery stenosis     Chronic kidney disease     Diastolic CHF ([facility] [address] 79/00/69    Echo 10/17/14 EF 69% stage I diastolic, 77/7/24  EF 41% with stage II diastolic chf    Hyperlipidemia     Hypertension     Obesity          Past Surgical History:   Procedure Laterality Date    CARDIAC CATHETERIZATION  10/31/14    severe triple vessel disease    CARDIAC SURGERY      stent x 2  in [facility]    CORONARY ANGIOPLASTY      CORONARY ARTERY BYPASS GRAFT  11-5-14    x 5    DIAGNOSTIC CARDIAC CATH LAB PROCEDURE  3/21/07    [facility]: Abnormal LV status, global hypokinesis, EF 50%. No AS, no MR. Left main luminal irregular. LAD prox 40-50% stenosis. Diag 1 prox 100% stenosis fills via collaterals from LAD. LCX prox 100% stenosis. Fills via collaterals from RCA. RCA distal 90% stenosis. PDA-right luminal irregular. RPLS prox 90% stenosis. Double vessel disease, severe. LV dysfunction mild.     DIAGNOSTIC CARDIAC CATH LAB PROCEDURE  3/22/07    stent x2, [facility]: Successful PCI of RCA, native vessel    [address] left negative tumor         Current Outpatient Medications   Medication Sig Dispense Refill    bumetanide (BUMEX) 1 MG tablet Take 1 tablet by mouth 2 times daily (Patient taking differently: Take 2 mg by mouth in the morning and 2 mg before bedtime.) 90 tablet 1    potassium chloride (KLOR-CON M) 10 MEQ extended release tablet Take 1 tablet by mouth 2 times daily With the Bumex 90 tablet 1    apixaban (ELIQUIS) 5 MG TABS tablet Take 1 tablet by mouth 2 times daily 180 tablet 3    aspirin 81 MG tablet Take 81 mg by mouth daily. metoprolol (LOPRESSOR) 100 MG tablet Take 100 mg by mouth daily (Patient not taking: Reported on 8/5/2022)      metOLazone (ZAROXOLYN) 5 MG tablet Take 1 tablet by mouth daily (Patient not taking: Reported on 8/5/2022) 30 tablet 0     No current facility-administered medications for this visit.          Allergies as of 08/05/2022 - Fully Reviewed 08/05/2022   Allergen Reaction Noted    Penicillins Anaphylaxis 04/22/2013       Social History     Socioeconomic History    Marital status:      Spouse name: Not on file    Number of children: Not on file    Years of education: Not on file    Highest education level: Not on file   Occupational History    Not on file   Tobacco Use    Smoking status: Never    Smokeless tobacco: Never   Vaping Use    Vaping Use: Never used   Substance and Sexual Activity    Alcohol use: No     Comment: once or twice a month     Drug use: No    Sexual activity: Not on file   Other Topics Concern    Not on file   Social History Narrative    Not on file     Social Determinants of Health     Financial Resource Strain: Not on file   Food Insecurity: Not on file   Transportation Needs: Not on file   Physical Activity: Not on file   Stress: Not on file   Social Connections: Not on file   Intimate Partner Violence: Not on file   Housing Stability: Not on file       Family History   Problem Relation Age of Onset    Heart Surgery Father        REVIEW OF SYSTEMS: CONSTITUTIONAL:  negative for  fevers, chills, sweats, + fatigue  HEENT:  negative for  tinnitus, earaches, nasal congestion and epistaxis  RESPIRATORY:  negative for  dry cough, cough with sputum, wheezing, + hemoptysis  GASTROINTESTINAL:  negative for nausea, vomiting, diarrhea, constipation, pruritus and jaundice  HEMATOLOGIC/LYMPHATIC:  negative for easy bruising, bleeding, lymphadenopathy and petechiae  ENDOCRINE:  negative for heat intolerance, cold intolerance, tremor, hair loss and diabetic symptoms including neither polyuria nor polydipsia nor blurred vision  MUSCULOSKELETAL:  negative for  myalgias, arthralgias, joint swelling, stiff joints and decreased range of motion  NEUROLOGICAL:  negative for memory problems, speech problems, visual disturbance, dysphagia, weakness and numbness      PHYSICAL EXAM:   CONSTITUTIONAL: awake, alert, cooperative, no apparent distress, and appears stated age  HEAD: normocepalic, without obvious abnormality, atraumatic  NECK: Supple, symmetrical, trachea midline, no adenopathy, thyroid symmetric  LUNGS: No increased work of breathing, good air exchange, basilar rales  CARDIOVASCULAR: Normal apical impulse, irregularly irregular, normal S1 and S2, no S3 or S4, and no murmur noted and no JVD, no carotid bruit. + pitting edema up to his thighs right more than left. ABDOMEN: Soft, nontender, no masses, no organomegaly, BS+  MUSCULOSKELETAL: No clubbing no cyanosis. there is no redness, warmth, or swelling of the joints.   NEUROLOGIC: Alert, awake,oriented x3    BP (!) 162/98   Pulse 88   Resp 16   Ht 5' 9\" (1.753 m)   Wt 240 lb (108.9 kg)   BMI 35.44 kg/m²     DATA:   I personally reviewed the visit EKG with the following interpretation: Atrial flutter with controlled ventricular response, PVCs versus aberrantly conducted beats, nonspecific T wave changes    EKG 6/20/2022, atrial fibrillation, controlled ventricular response, incomplete right bundle branch block, occasional PVCs versus aberrantly conducted beats, nonspecific T wave changes    EKG 5/21/2022, atrial fibrillation, slow ventricular response, incomplete right bundle branch block    EKG 3/12/21  Rhythm, nonspecific T wave changes in the lateral leads, poor R wave progression and first-degree AV block    ECHO: 7/19/2022,   Left ventricular internal dimensions were normal in diastole and systole. Moderate left ventricular concentric hypertrophy noted. Abnormal (paradoxical) motion consistent with conduction abnormality. Low normal left ventricular systolic function. The left atrium is severely dilated. Moderately enlarged right atrium size. Normal mitral valve leaflet thickness with apical tethering. Moderate centrally directed mitral regurgitation. Physiologic and/or trace aortic regurgitation is noted. Mild tricuspid regurgitation. Pulmonary hypertension is moderate . There is a trivial circumferential pericardial effusion noted. 5/11/15 Summary   Compared to prior echo, no changes noted. Technically adequate study. Mild asymmetric left ventricular hypertrophy   Ejection fraction is visually estimated at 50%. Mild global wall hypokinesis. There is doppler evidence of stage III diastolic dysfunction. Right ventricle global systolic function is mildly reduced . Mild tricuspid regurgitation. RVSP was not measured. Stress Test:     Angiography: 10/31/14 IMPRESSION:  Severe triple-vessel disease.        Cardiology Labs: BMP:    Lab Results   Component Value Date/Time     06/20/2022 02:14 PM    K 3.7 06/20/2022 02:14 PM     06/20/2022 02:14 PM    CO2 33 06/20/2022 02:14 PM    BUN 30 06/20/2022 02:14 PM    CREATININE 1.8 06/20/2022 02:14 PM     CMP:    Lab Results   Component Value Date/Time     06/20/2022 02:14 PM    K 3.7 06/20/2022 02:14 PM     06/20/2022 02:14 PM    CO2 33 06/20/2022 02:14 PM    BUN 30 06/20/2022 02:14 PM    CREATININE 1.8 06/20/2022 02:14 PM    PROT 6.8 03/20/2015 04:30 AM     CBC:    Lab Results   Component Value Date/Time    WBC 10.4 03/19/2015 08:33 PM    RBC 5.19 03/19/2015 08:33 PM    HGB 13.2 03/19/2015 08:33 PM    HCT 41.8 03/19/2015 08:33 PM    MCV 80.5 03/19/2015 08:33 PM    RDW 16.5 03/19/2015 08:33 PM     03/19/2015 08:33 PM     PT/INR:  No results found for: PTINR  PT/INR Warfarin:  No components found for: PTPATWAR, PTINRWAR  PTT:    Lab Results   Component Value Date/Time    APTT 28.2 11/05/2014 05:55 PM     PTT Heparin:  No components found for: APTTHEP  Magnesium:    Lab Results   Component Value Date/Time    MG 2.4 11/17/2014 04:45 AM     TSH:  No results found for: TSH  TROPONIN:  No components found for: TROP  BNP:    Lab Results   Component Value Date/Time    BNP 1,384 11/24/2014 06:20 PM     FASTING LIPID PANEL:    Lab Results   Component Value Date/Time    CHOL 128 10/29/2014 11:32 AM    HDL 35 10/29/2014 11:32 AM    TRIG 131 10/29/2014 11:32 AM     No orders to display     I have personally reviewed the laboratory, cardiac diagnostic and radiographic testing as outlined above:      IMPRESSION:  1. Acute congestive heart failure: Acute on chronic, suspect diastolic, decompensated, will continue current treatment for now, will check basic metabolic panel and BNP, will need ischemic evaluation to rule out ischemia as a cause for his exacerbation. 2.  Atrial fibrillation: Persistent, newly diagnosed, controlled ventricular response, [id number] score of 3, onchronic anticoagulation with Eliquis 5 mg 1 by mouth twice daily. 3. Hypertension: Not well controlled  4. Coronary artery disease status post CABG:left internal mammary artery to the LAD, saphenous vein graft to the diagonal branch of the LAD, saphenous vein graft to the posterolateral with a Y graft to the PDA, and the left radial artery to the obtuse marginal, s/p remote PCI to RCA and LAD, as above  5. Mitral valve regurgitation: Moderate  6.   Tricuspid valve regurgitation: Mild  7. Chronic anticoagulation  8. Stage III chronic kidney disease  9. Hemoptysis: Stated he has occasional cough with blood-tinged sputum    RECOMMENDATIONS:   1. Basic metabolic panel, BNP and TSH  2. Chest x-ray  3. Lexiscan stress test  4. Continue current treatment  5. Increase risk of bleeding due to being on anti-coagulation, symptoms and signs of bleeding discussed with patient, patient was advised to seek medical attention at the earliest symptoms or signs of bleeding. 6.  CHF: Daily weight, take an extra Bumex for weight gain of more than 2-3 pounds in 24 hours, compliance with diuretics, low-salt diet were all advised. 7.  Patient was advised to check his blood pressure twice daily, call with the blood pressure readings next week  8. Follow-up with Dr. Brady Marquez as scheduled  9. Follow-up with Dr. Josee Almonte after his tests    I have reviewed my findings and recommendations with patient    Electronically signed by Gagan Funes MD on 8/5/2022 at 8:57 AM    NOTE: This report was transcribed using voice recognition software.  Every effort was made to ensure accuracy; however, inadvertent computerized transcription errors may be present

## 2022-08-08 ENCOUNTER — TELEPHONE (OUTPATIENT)
Dept: CARDIOLOGY CLINIC | Age: 57
End: 2022-08-08

## 2022-08-09 RX ORDER — METOLAZONE 5 MG/1
5 TABLET ORAL DAILY
Qty: 30 TABLET | Refills: 0 | Status: SHIPPED
Start: 2022-08-09 | End: 2022-08-16

## 2022-08-09 RX ORDER — AMLODIPINE BESYLATE 10 MG/1
10 TABLET ORAL DAILY
Qty: 90 TABLET | Refills: 2 | Status: ON HOLD
Start: 2022-08-09 | End: 2022-08-16 | Stop reason: SDUPTHER

## 2022-08-10 ENCOUNTER — TELEPHONE (OUTPATIENT)
Dept: CARDIOLOGY | Age: 57
End: 2022-08-10

## 2022-08-10 NOTE — TELEPHONE ENCOUNTER
Spoke w/ pt to confirm stress for 8/12/22.   Instructions given included: nothing to eat/drink after midnight except sips of water, hold all forms of caffeine for 12 hrs prior to test.  All questions answered

## 2022-08-12 ENCOUNTER — HOSPITAL ENCOUNTER (OUTPATIENT)
Dept: CARDIOLOGY | Age: 57
Discharge: HOME | End: 2022-08-12
Payer: COMMERCIAL

## 2022-08-12 ENCOUNTER — HOSPITAL ENCOUNTER (INPATIENT)
Age: 57
LOS: 4 days | Discharge: HOME | DRG: 604 | End: 2022-08-16
Attending: EMERGENCY MEDICINE | Admitting: INTERNAL MEDICINE
Payer: COMMERCIAL

## 2022-08-12 ENCOUNTER — TELEPHONE (OUTPATIENT)
Dept: CARDIOLOGY CLINIC | Age: 57
End: 2022-08-12

## 2022-08-12 ENCOUNTER — APPOINTMENT (OUTPATIENT)
Dept: GENERAL RADIOLOGY | Age: 57
DRG: 604 | End: 2022-08-12
Payer: COMMERCIAL

## 2022-08-12 ENCOUNTER — APPOINTMENT (OUTPATIENT)
Dept: CT IMAGING | Age: 57
DRG: 604 | End: 2022-08-12
Payer: COMMERCIAL

## 2022-08-12 VITALS
SYSTOLIC BLOOD PRESSURE: 168 MMHG | DIASTOLIC BLOOD PRESSURE: 98 MMHG | RESPIRATION RATE: 18 BRPM | OXYGEN SATURATION: 96 % | HEART RATE: 78 BPM

## 2022-08-12 DIAGNOSIS — R06.02 SOB (SHORTNESS OF BREATH): ICD-10-CM

## 2022-08-12 DIAGNOSIS — D64.9 ANEMIA, UNSPECIFIED TYPE: ICD-10-CM

## 2022-08-12 DIAGNOSIS — E87.6 HYPOKALEMIA: Primary | ICD-10-CM

## 2022-08-12 DIAGNOSIS — T14.8XXA INTRAMUSCULAR HEMATOMA: ICD-10-CM

## 2022-08-12 DIAGNOSIS — R06.09 DOE (DYSPNEA ON EXERTION): ICD-10-CM

## 2022-08-12 DIAGNOSIS — R18.8 CIRRHOSIS OF LIVER WITH ASCITES, UNSPECIFIED HEPATIC CIRRHOSIS TYPE (HCC): ICD-10-CM

## 2022-08-12 DIAGNOSIS — I25.10 CORONARY ARTERY DISEASE WITHOUT ANGINA PECTORIS, UNSPECIFIED VESSEL OR LESION TYPE, UNSPECIFIED WHETHER NATIVE OR TRANSPLANTED HEART: ICD-10-CM

## 2022-08-12 DIAGNOSIS — T14.8XXA BRUISING: ICD-10-CM

## 2022-08-12 DIAGNOSIS — R05.9 COUGH: ICD-10-CM

## 2022-08-12 DIAGNOSIS — K74.60 CIRRHOSIS OF LIVER WITH ASCITES, UNSPECIFIED HEPATIC CIRRHOSIS TYPE (HCC): ICD-10-CM

## 2022-08-12 DIAGNOSIS — Z95.1 STATUS POST CORONARY ARTERY BYPASS GRAFT: ICD-10-CM

## 2022-08-12 DIAGNOSIS — D62 ABLA (ACUTE BLOOD LOSS ANEMIA): ICD-10-CM

## 2022-08-12 DIAGNOSIS — J90 PLEURAL EFFUSION, BILATERAL: ICD-10-CM

## 2022-08-12 DIAGNOSIS — R94.31 ABNORMAL EKG: ICD-10-CM

## 2022-08-12 LAB
ABO/RH: NORMAL
ALBUMIN SERPL-MCNC: 3.8 G/DL (ref 3.5–5.2)
ALP BLD-CCNC: 116 U/L (ref 40–129)
ALT SERPL-CCNC: 8 U/L (ref 0–40)
ANION GAP SERPL CALCULATED.3IONS-SCNC: 13 MMOL/L (ref 7–16)
ANTIBODY SCREEN: NORMAL
APTT: 29.3 SEC (ref 24.5–35.1)
AST SERPL-CCNC: 15 U/L (ref 0–39)
BASOPHILS ABSOLUTE: 0.04 E9/L (ref 0–0.2)
BASOPHILS RELATIVE PERCENT: 0.4 % (ref 0–2)
BILIRUB SERPL-MCNC: 2.4 MG/DL (ref 0–1.2)
BUN BLDV-MCNC: 33 MG/DL (ref 6–20)
CALCIUM SERPL-MCNC: 9.5 MG/DL (ref 8.6–10.2)
CHLORIDE BLD-SCNC: 94 MMOL/L (ref 98–107)
CO2: 31 MMOL/L (ref 22–29)
CREAT SERPL-MCNC: 1.9 MG/DL (ref 0.7–1.2)
EOSINOPHILS ABSOLUTE: 0.1 E9/L (ref 0.05–0.5)
EOSINOPHILS RELATIVE PERCENT: 1 % (ref 0–6)
GFR AFRICAN AMERICAN: 44
GFR NON-AFRICAN AMERICAN: 36 ML/MIN/1.73
GLUCOSE BLD-MCNC: 93 MG/DL (ref 74–99)
HCT VFR BLD CALC: 26.3 % (ref 37–54)
HCT VFR BLD CALC: 29.8 % (ref 37–54)
HEMOGLOBIN: 8 G/DL (ref 12.5–16.5)
HEMOGLOBIN: 9.1 G/DL (ref 12.5–16.5)
IMMATURE GRANULOCYTES #: 0.07 E9/L
IMMATURE GRANULOCYTES %: 0.7 % (ref 0–5)
INR BLD: 1.2
LACTIC ACID: 1.1 MMOL/L (ref 0.5–2.2)
LYMPHOCYTES ABSOLUTE: 1.08 E9/L (ref 1.5–4)
LYMPHOCYTES RELATIVE PERCENT: 10.4 % (ref 20–42)
MAGNESIUM: 2.2 MG/DL (ref 1.6–2.6)
MCH RBC QN AUTO: 25.9 PG (ref 26–35)
MCHC RBC AUTO-ENTMCNC: 30.5 % (ref 32–34.5)
MCV RBC AUTO: 84.9 FL (ref 80–99.9)
MONOCYTES ABSOLUTE: 1.1 E9/L (ref 0.1–0.95)
MONOCYTES RELATIVE PERCENT: 10.6 % (ref 2–12)
NEUTROPHILS ABSOLUTE: 7.95 E9/L (ref 1.8–7.3)
NEUTROPHILS RELATIVE PERCENT: 76.9 % (ref 43–80)
PDW BLD-RTO: 16.5 FL (ref 11.5–15)
PLATELET # BLD: 227 E9/L (ref 130–450)
PMV BLD AUTO: 9.3 FL (ref 7–12)
POTASSIUM REFLEX MAGNESIUM: 2.7 MMOL/L (ref 3.5–5)
PROTHROMBIN TIME: 14.1 SEC (ref 9.3–12.4)
RBC # BLD: 3.51 E12/L (ref 3.8–5.8)
SODIUM BLD-SCNC: 138 MMOL/L (ref 132–146)
TOTAL PROTEIN: 7.1 G/DL (ref 6.4–8.3)
TROPONIN, HIGH SENSITIVITY: 39 NG/L (ref 0–11)
TROPONIN, HIGH SENSITIVITY: 40 NG/L (ref 0–11)
WBC # BLD: 10.3 E9/L (ref 4.5–11.5)

## 2022-08-12 PROCEDURE — 85025 COMPLETE CBC W/AUTO DIFF WBC: CPT

## 2022-08-12 PROCEDURE — 85018 HEMOGLOBIN: CPT

## 2022-08-12 PROCEDURE — 6360000002 HC RX W HCPCS: Performed by: STUDENT IN AN ORGANIZED HEALTH CARE EDUCATION/TRAINING PROGRAM

## 2022-08-12 PROCEDURE — 96365 THER/PROPH/DIAG IV INF INIT: CPT

## 2022-08-12 PROCEDURE — 6370000000 HC RX 637 (ALT 250 FOR IP): Performed by: STUDENT IN AN ORGANIZED HEALTH CARE EDUCATION/TRAINING PROGRAM

## 2022-08-12 PROCEDURE — 96366 THER/PROPH/DIAG IV INF ADDON: CPT

## 2022-08-12 PROCEDURE — 99285 EMERGENCY DEPT VISIT HI MDM: CPT

## 2022-08-12 PROCEDURE — 99223 1ST HOSP IP/OBS HIGH 75: CPT | Performed by: INTERNAL MEDICINE

## 2022-08-12 PROCEDURE — 85610 PROTHROMBIN TIME: CPT

## 2022-08-12 PROCEDURE — 78452 HT MUSCLE IMAGE SPECT MULT: CPT

## 2022-08-12 PROCEDURE — 6360000004 HC RX CONTRAST MEDICATION: Performed by: RADIOLOGY

## 2022-08-12 PROCEDURE — 80053 COMPREHEN METABOLIC PANEL: CPT

## 2022-08-12 PROCEDURE — 84484 ASSAY OF TROPONIN QUANT: CPT

## 2022-08-12 PROCEDURE — 6360000002 HC RX W HCPCS: Performed by: INTERNAL MEDICINE

## 2022-08-12 PROCEDURE — 83735 ASSAY OF MAGNESIUM: CPT

## 2022-08-12 PROCEDURE — 86900 BLOOD TYPING SEROLOGIC ABO: CPT

## 2022-08-12 PROCEDURE — 93017 CV STRESS TEST TRACING ONLY: CPT

## 2022-08-12 PROCEDURE — 2580000003 HC RX 258: Performed by: INTERNAL MEDICINE

## 2022-08-12 PROCEDURE — 2060000000 HC ICU INTERMEDIATE R&B

## 2022-08-12 PROCEDURE — 71260 CT THORAX DX C+: CPT

## 2022-08-12 PROCEDURE — 85730 THROMBOPLASTIN TIME PARTIAL: CPT

## 2022-08-12 PROCEDURE — 86850 RBC ANTIBODY SCREEN: CPT

## 2022-08-12 PROCEDURE — 85014 HEMATOCRIT: CPT

## 2022-08-12 PROCEDURE — 3430000000 HC RX DIAGNOSTIC RADIOPHARMACEUTICAL: Performed by: INTERNAL MEDICINE

## 2022-08-12 PROCEDURE — A9502 TC99M TETROFOSMIN: HCPCS | Performed by: INTERNAL MEDICINE

## 2022-08-12 PROCEDURE — 71045 X-RAY EXAM CHEST 1 VIEW: CPT

## 2022-08-12 PROCEDURE — 86901 BLOOD TYPING SEROLOGIC RH(D): CPT

## 2022-08-12 PROCEDURE — 83605 ASSAY OF LACTIC ACID: CPT

## 2022-08-12 PROCEDURE — 74177 CT ABD & PELVIS W/CONTRAST: CPT

## 2022-08-12 PROCEDURE — 93005 ELECTROCARDIOGRAM TRACING: CPT | Performed by: STUDENT IN AN ORGANIZED HEALTH CARE EDUCATION/TRAINING PROGRAM

## 2022-08-12 RX ORDER — POTASSIUM CHLORIDE 750 MG/1
10 TABLET, EXTENDED RELEASE ORAL 2 TIMES DAILY
Status: ON HOLD | COMMUNITY
End: 2022-08-16 | Stop reason: HOSPADM

## 2022-08-12 RX ORDER — SODIUM CHLORIDE 0.9 % (FLUSH) 0.9 %
10 SYRINGE (ML) INJECTION PRN
Status: DISCONTINUED | OUTPATIENT
Start: 2022-08-12 | End: 2022-08-13 | Stop reason: HOSPADM

## 2022-08-12 RX ORDER — POTASSIUM CHLORIDE 7.45 MG/ML
10 INJECTION INTRAVENOUS
Status: COMPLETED | OUTPATIENT
Start: 2022-08-12 | End: 2022-08-12

## 2022-08-12 RX ORDER — POTASSIUM CHLORIDE 20 MEQ/1
40 TABLET, EXTENDED RELEASE ORAL ONCE
Status: COMPLETED | OUTPATIENT
Start: 2022-08-12 | End: 2022-08-12

## 2022-08-12 RX ADMIN — IOPAMIDOL 50 ML: 755 INJECTION, SOLUTION INTRAVENOUS at 20:00

## 2022-08-12 RX ADMIN — TETROFOSMIN 28.6 MILLICURIE: 0.23 INJECTION, POWDER, LYOPHILIZED, FOR SOLUTION INTRAVENOUS at 09:05

## 2022-08-12 RX ADMIN — REGADENOSON 0.4 MG: 0.08 INJECTION, SOLUTION INTRAVENOUS at 09:05

## 2022-08-12 RX ADMIN — POTASSIUM CHLORIDE 40 MEQ: 1500 TABLET, EXTENDED RELEASE ORAL at 20:14

## 2022-08-12 RX ADMIN — POTASSIUM CHLORIDE 10 MEQ: 7.46 INJECTION, SOLUTION INTRAVENOUS at 20:19

## 2022-08-12 RX ADMIN — TETROFOSMIN 8.4 MILLICURIE: 0.23 INJECTION, POWDER, LYOPHILIZED, FOR SOLUTION INTRAVENOUS at 07:55

## 2022-08-12 RX ADMIN — SODIUM CHLORIDE, PRESERVATIVE FREE 10 ML: 5 INJECTION INTRAVENOUS at 07:55

## 2022-08-12 RX ADMIN — SODIUM CHLORIDE, PRESERVATIVE FREE 10 ML: 5 INJECTION INTRAVENOUS at 09:05

## 2022-08-12 RX ADMIN — POTASSIUM CHLORIDE 10 MEQ: 7.46 INJECTION, SOLUTION INTRAVENOUS at 21:25

## 2022-08-12 RX ADMIN — SODIUM CHLORIDE, PRESERVATIVE FREE 10 ML: 5 INJECTION INTRAVENOUS at 09:04

## 2022-08-12 RX ADMIN — POTASSIUM CHLORIDE 10 MEQ: 7.46 INJECTION, SOLUTION INTRAVENOUS at 22:25

## 2022-08-12 ASSESSMENT — PAIN - FUNCTIONAL ASSESSMENT
PAIN_FUNCTIONAL_ASSESSMENT: NONE - DENIES PAIN

## 2022-08-12 ASSESSMENT — ENCOUNTER SYMPTOMS
EYE ITCHING: 0
FACIAL SWELLING: 0
COUGH: 0
BACK PAIN: 0
CONSTIPATION: 0
ABDOMINAL PAIN: 0
TROUBLE SWALLOWING: 0
EYE PAIN: 0
DIARRHEA: 0
RHINORRHEA: 0
NAUSEA: 0
VOMITING: 0
SORE THROAT: 0
SHORTNESS OF BREATH: 0
EYE REDNESS: 0

## 2022-08-12 NOTE — TELEPHONE ENCOUNTER
Patient calling in with concerns regarding his back bruising. He states it has now spread to the front of his abdomen & into the groin area. Not painful, just a bright purple according to the patient. He is also calling in with his blood pressure readings from the last 2 days.     8/11/22:  160/74  125/73  150/89    8/12/22:  150/72  140/46

## 2022-08-12 NOTE — DISCHARGE INSTRUCTIONS
[address],[facility]      Instructions to Patients    The following are the instructions for patients who have had a procedure in our office today. Patient name: Sienna Brown    Radionuclide Activity: 40mCi of 99mTc-Tetrofosmin (Myoview)    Date Administered: 8/12/2022    Expires: 48 hours after scheduled appointment time      Patient may resume normal activity unless otherwise instructed. Patient may resume medications as normal.  If the need should arise, patient may call [phone] between the hours of 8:00am-4:30pm.  After hours there is at least one physician on-call at all times for those patients needing assistance. Patients may call [phone] and the answering service will direct the patient to one of our physicians for assistance. After the patient's test if they are going to be leaving from an airport in the near future they should take this letter with them to verify the test and radionuclide used for their test.      This letter verifies that the above named bearer received an injection of a radionuclide for medical purpose/usage only.         Electronically signed by Pearl Tabares on 8/12/2022 at 8:41 AM

## 2022-08-12 NOTE — RESULT ENCOUNTER NOTE
Brad Herrera  Would you please let the patient know that the stress test showed old scarring from previous heart attack, which is expected given his history, I will see him as scheduled  Thank you

## 2022-08-12 NOTE — PROCEDURES
[address] [facility] - [address], [address] [place] [address]  [phone]               Pharmacologic Stress Nuclear Gated SPECT Study    Name: [facility] Account Number: [de-identified]    :  1965          Sex: male         Date of Study:  2022                    Ordering Provider: Diamond Saleh MD          PCP: Jaja Borjas DO      Cardiologist: Diamond Saleh MD              Interpreting Physician: Aj Little MD  _________________________________________________________________________________    Indication: CAD  Evaluation of extent and severity of coronary artery disease    Clinical History:   Patient has prior history of coronary artery disease. Resting ECG:  A Fib    Procedure:   Pharmacologic stress testing was performed with regadenoson 0.4 mg for 15 seconds. The heart rate was 78 at baseline and robert to 112 beats during the infusion. The blood pressure at baseline was 168/98 and blood pressure at the end of infusion was 150/88. Blood pressure response was normal during the stress procedure. The patient experienced mild shortness of breath and nausea during the infusion. ECG during the infusion did not change. IMAGING: Myocardial perfusion imaging was performed at rest 30-35 minutes following the intravenous injection of 8.4 mCi of (Tc-tetrofosmin) followed by 10 ml of Normal Saline. As per infusion protocol, the patient was injected intravenously with 28.6 mCi of (Tc-tetrofosmin) followed by 10 ml of Normal Saline. Gated post-stress tomographic imaging was performed 45 minutes after stress. FINDINGS: The overall quality of the study was good. Left ventricular cavity size was noted to be enlarged on both rest and stress studies. Rotational analog analysis demonstrated no patient motion or abnormal extracardiac radioactivity.     The gated SPECT stress imaging in the short, vertical long, and horizontal long axis demonstrated A mild defect was present in the mid anterior wall(s) that was  small size on the post regadenoson images       The resting images are show no change. Gated SPECT left ventricular ejection fraction was calculated to be 45-50%, with mild global hypokinesis, abnormal septal motion. Impression:    Electrocardiographically normal regadenoson infusion with a clinically nonischemic response. Myocardial perfusion imaging was abnormal.    The abnormality was a a small sized fixed defect in the mid inferior wall suggestive of a prior MI  Overall left ventricular systolic function mildly reduced at 45-50%, with mild global hypokinesis, abnormal septal motion. 4.   Low to Intermediate risk general pharmacologic stress test    No recent study to compare. Thank you for sending your patient to this [facility].      Electronically signed by Waqar Rangel MD on 8/12/2022 at 4:42 PM

## 2022-08-12 NOTE — TELEPHONE ENCOUNTER
Continue to hold Eliquis, if it spreads anymore needs to go and get evaluated in the emergency room  Can increase amlodipine to 10 mg daily  If he can call us next week with a progress

## 2022-08-12 NOTE — ED PROVIDER NOTES
Name: Denisa Mabry   MRN: [id number]     --------------------------------------------- History of Present Illness ---------------------------------------------  8/12/22, Time: 6:59 PM EDT   Chief Complaint   Patient presents with    Bleeding/Bruising     Had a stress test this am, bruising noted to right shoulder blade down back to hips with weight gain, short of breath, no chest pain      HPI    Denisa Mabry is a 59 y.o. male, with hx of A-fib (was started on eloquis by Dr. Ramon Khanna, cardiology, 3 weeks ago and taken off eloquis 3 days ago), who presents to the ED today for bruising along the right back, side and lower abdomen x 3 days. Patient did see his cardiologist few days ago who took him off Eliquis due to the bruising. He states the bruising is worsened. States he does feel somewhat of a hardened mass in the right posterior shoulder. He denies any falls or trauma. He states he was practicing his golf swing about a week or 2 ago has had some shoulder pain afterwards but this is the only thing he could think of that could be related. Patient states his bruising is severe, no exacerbating or relieving factors. He has not take anything for this. The pt denies other ROS at this time. Allg: Penicillins and Cucumber extract   PCP: No primary care provider on file. .    Meds:   Current Facility-Administered Medications:     amLODIPine (NORVASC) tablet 10 mg, 10 mg, Oral, Daily, Suzy Mcgrath MD    sodium chloride flush 0.9 % injection 10 mL, 10 mL, IntraVENous, 2 times per day, Suzy Mcgrath MD    sodium chloride flush 0.9 % injection 10 mL, 10 mL, IntraVENous, PRN, Suzy Mcgrath MD    0.9 % sodium chloride infusion, , IntraVENous, PRN, Suzy Mcgrath MD    polyethylene glycol (GLYCOLAX) packet 17 g, 17 g, Oral, Daily PRN, Suzy Mcgrath MD    acetaminophen (TYLENOL) tablet 650 mg, 650 mg, Oral, Q6H PRN **OR** acetaminophen (TYLENOL) suppository 650 mg, 650 mg, Rectal, Q6H PRN, [place] MD potassium chloride 20 mEq/50 mL IVPB (Central Line), 20 mEq, IntraVENous, PRN **OR** potassium chloride 10 mEq/100 mL IVPB (Peripheral Line), 10 mEq, IntraVENous, PRN, Caroline Parker MD, Last Rate: 100 mL/hr at 08/13/22 0133, 10 mEq at 08/13/22 0133     Review of Systems   Constitutional:  Negative for chills, fatigue and fever. HENT:  Negative for congestion, facial swelling, rhinorrhea, sore throat and trouble swallowing. Eyes:  Negative for pain, redness and itching. Respiratory:  Negative for cough and shortness of breath. Cardiovascular:  Negative for chest pain and palpitations. Gastrointestinal:  Negative for abdominal pain, constipation, diarrhea, nausea and vomiting. Endocrine: Negative for polyuria. Genitourinary:  Negative for difficulty urinating, dysuria, flank pain and hematuria. Musculoskeletal:  Negative for arthralgias, back pain, myalgias and neck pain. Skin:  Negative for pallor, rash and wound. Neurological:  Negative for dizziness, syncope, weakness, numbness and headaches. Hematological:  Bruises/bleeds easily. Psychiatric/Behavioral:  Negative for agitation, behavioral problems and confusion. The patient is not nervous/anxious. Physical Exam  Vitals and nursing note reviewed. Constitutional:       General: He is not in acute distress. Appearance: Normal appearance. He is obese. He is not ill-appearing, toxic-appearing or diaphoretic. HENT:      Head: Normocephalic and atraumatic. Right Ear: External ear normal.      Left Ear: External ear normal.      Nose: Nose normal. No rhinorrhea. Mouth/Throat:      Mouth: Mucous membranes are moist.      Pharynx: Oropharynx is clear. Eyes:      General: No scleral icterus. Right eye: No discharge. Left eye: No discharge. Pupils: Pupils are equal, round, and reactive to light. Cardiovascular:      Rate and Rhythm: Normal rate and regular rhythm. Pulses: Normal pulses. Pulmonary:      Effort: Pulmonary effort is normal. No respiratory distress. Breath sounds: Normal breath sounds. Abdominal:      General: There is no distension. Palpations: Abdomen is soft. Tenderness: There is no abdominal tenderness. There is no right CVA tenderness, left CVA tenderness, guarding or rebound. Musculoskeletal:         General: Swelling (Inferior to the right scapula) present. No tenderness or deformity. Normal range of motion. Cervical back: Normal range of motion. No tenderness. Right lower leg: Edema present. Left lower leg: Edema present. Skin:     General: Skin is warm and dry. Capillary Refill: Capillary refill takes less than 2 seconds. Coloration: Skin is not jaundiced or pale. Findings: Bruising (Right lateral chest, flank, lower back and groin) present. No erythema, lesion or rash. Neurological:      General: No focal deficit present. Mental Status: He is alert and oriented to person, place, and time. Cranial Nerves: No cranial nerve deficit. Motor: No weakness. Psychiatric:         Mood and Affect: Mood normal.         Behavior: Behavior normal.                  Procedures     MDM  Number of Diagnoses or Management Options  Anemia, unspecified type  Bruising  Cirrhosis of liver with ascites, unspecified hepatic cirrhosis type (HCC)  Hypokalemia  Intramuscular hematoma  Pleural effusion, bilateral  Diagnosis management comments: Patient is a 64-year-old male who presents today for bruising along the right side, lower back as well as into the groin region which started 3 days ago. Patient was recently placed on Eliquis per his cardiologist for A. Fib, and was taken off Eliquis 2 days ago due to this bruising which is developed. Patient had a cardiac stress test this morning and was recommended to come to the ED for evaluation of this bruising. Patient alert, oriented, no distress. Vital stable.     Severe bruising from the right mid back region to the right flank and into the groin is appreciated. There is a palpable mass under the right scapular region, felt likely to be hematoma. Patient denies any trauma however states he was practicing his golf swing about a week ago and noticed some right shoulder pain. Lab work was remarkable for hypokalemia 2.7, replacement was given. Hemoglobin of 9.1, decreased approximately 5 points from recent blood draw just 1 week ago. CT chest showed large heterogeneous mass likely intramuscular hematoma of the back region. IV fluids were given as patient initially mildly tachycardic. Repeat H&H showed a hemoglobin of 8, possibly due to some dilution as pt was just given fluids. CT abdomen was remarkable for some moderate ascites, possibly related to cirrhotic liver. Patient denies any heavy alcohol use, and states he only drank intermittently many years ago. Spoke with radiologist on the phone about the intra-abdominal fluid and radiologist felt this was likely to be ascitic fluid as he has cirrhotic changes of the liver and the fluid as ascitic appearance to him. Bilateral pleural effusions noted on CT. Patient has had lower extremity edema previously and patient was on Lasix previously however this was recently stopped. Pt also has had to have thoracentesis previously for pleural effusion. Patient denies any shortness of breath. Lung sounds clear and equal. Trops 39>40, no ischemic changes on EKG. As pt has had acute drop in Hgb over the past week and new onset ascites with no previous hx of cirrhosis, recommended admission for further care and he was amenable to plan. Spoke with Dr. Dayton Torres, hospitalist, who will admit pt for further care.         Amount and/or Complexity of Data Reviewed  Decide to obtain previous medical records or to obtain history from someone other than the patient: yes       EKG Interpretation  Interpreted by emergency department physician. 8/13/22  Time: 1908    Rate: 87  Axis: normal  HI: ---  QRS: 110  Qtc: 459  Rhythm: irregular  Clinical Impression: a-fib with frequent PVC  Comparison to old EKG: Similar to prev 6/20/22      ED Course as of 08/13/22 0221   Fri Aug 12, 2022   1910   ATTENDING PROVIDER ATTESTATION:     I have personally performed and/or participated in the history, exam, medical decision making, and procedures and agree with all pertinent clinical information unless otherwise noted. I have also reviewed and agree with the past medical, family and social history unless otherwise noted. I have discussed this patient in detail with the resident and provided the instruction and education regarding the evidence-based evaluation and treatment of chest wall ecchymosis. History: Patient presents to the ED for evaluation of bruising on his posterior right chest wall. Patient states that on Tuesday he noticed some pain in the back area. He then noticed that the area was swollen and ecchymotic. Couple days before that he had been practicing a golf swing. And prior to that he was just started on anticoagulants for new onset atrial fibrillation. He states that since then the area has become slightly more swollen and the bruising has traveled down into his pelvis on the right. Denies any significant pain on the right side of abdomen or pelvis. Had a heart cath today and was told that if his symptoms get worse to come to the ED to be evaluated. Denies any shortness of breath. Only complains mostly of pain at the site of the suspected hematoma. My findings: Lizabeth Klein is a 59 y.o. male whom is in no distress. Physical exam reveals patient to have an area of ecchymosis on the posterior right back. There is a firm mass underneath this area which most likely is a hematoma. There is overlying ecchymosis. Tracking down into the pelvis there is ecchymosis. Very minimal tenderness appreciated there.   No diaphoresis or pallor noted. Patient is mildly tachycardic in atrial fibrillation. My plan: Symptomatic and supportive care. Repeat labs and imaging. Electronically signed by Zackary Hurtado DO on 8/12/22 at 7:10 PM EDT       [MS]   1926 Hemoglobin Quant(!): 9.1 [PW]   1951 Potassium(!!): 2.7 [PW]   2038 Creatinine(!): 1.9  Similar to previous [PW]   2052 CT CHEST W CONTRAST  IMPRESSION:  1. Heterogeneous mass associated with posterolateral right chest wall  musculature related to large intramuscular hematoma with overlying soft  tissue edema. 2. No evidence of underlying rib fracture. 3. Moderate right pleural effusion. Small left pleural effusion. 4. Atelectatic changes in right lung base  5. Pulmonary vascular congestion. 6. View of the upper abdomen shows ascites. [PW]   2159 Troponin, High Sensitivity(!): 39 [PW]   2159 CT ABDOMEN PELVIS W IV CONTRAST Additional Contrast? None  IMPRESSION:  1. An early cirrhotic change of the liver  2. Moderate volume of ascites within the abdomen and pelvis  3. Hematoma of the right serratus anterior muscle. 4. Sigmoid diverticulosis but no signs of diverticulitis. 5. There is marked stranding of the subcutaneous tissues along the abdominal  wall concerning for a low protein state. [PW]   5616 Resting in bed in no distress. Discussed results of imaging and labs with him. Patient's second hemoglobin dropped to 8 from 9.1. I believe that this is most likely secondary to dilution from the liter of fluid he was given. I do not feel that he is actively bleeding. He does have ascitic fluid in the belly. Abdomen soft with minimal tenderness. Do not suspect blood within the abdomen. We did speak with the radiologist and he believes that the fluid is ascitic fluid and not blood.  [MS]   4607 Spoke with radiologist on the phone who states the fluid in the abdomen is most likely ascites has there are signs of liver cirrhosis and the fluid has ascitic appearance to him.    Abdomen is nontender on palpation and reexamination. [PW]      ED Course User Index  [MS] Con Dumas,   [PW] Henrietta Kellogg,         --------------------------------------------- PAST HISTORY ---------------------------------------------  Past Medical History:  has a past medical history of Atrial fibrillation ([facility] [address] CAD (coronary artery disease), Carotid artery stenosis, Chronic kidney disease, Diastolic CHF ([facility] [address] Hyperlipidemia, Hypertension, and Obesity. Past Surgical History:  has a past surgical history that includes Cardiac surgery; Elbow surgery (1969); Cardiac catheterization (10/31/14); Coronary angioplasty; Diagnostic Cardiac Cath Lab Procedure (3/21/07); Diagnostic Cardiac Cath Lab Procedure (3/22/07); and Coronary artery bypass graft (11-5-14). Social History:  reports that he has quit smoking. His smoking use included cigarettes. He started smoking about 22 years ago. He has never used smokeless tobacco. He reports that he does not drink alcohol and does not use drugs. Family History: family history includes Heart Attack in his father; Heart Surgery in his father. The patients home medications have been reviewed.     Allergies: Penicillins and Cucumber extract    -------------------------------------------------- RESULTS -------------------------------------------------  Labs:  Results for orders placed or performed during the hospital encounter of 08/12/22   CBC with Auto Differential   Result Value Ref Range    WBC 10.3 4.5 - 11.5 E9/L    RBC 3.51 (L) 3.80 - 5.80 E12/L    Hemoglobin 9.1 (L) 12.5 - 16.5 g/dL    Hematocrit 29.8 (L) 37.0 - 54.0 %    MCV 84.9 80.0 - 99.9 fL    MCH 25.9 (L) 26.0 - 35.0 pg    MCHC 30.5 (L) 32.0 - 34.5 %    RDW 16.5 (H) 11.5 - 15.0 fL    Platelets 095 655 - 279 E9/L    MPV 9.3 7.0 - 12.0 fL    Neutrophils % 76.9 43.0 - 80.0 %    Immature Granulocytes % 0.7 0.0 - 5.0 %    Lymphocytes % 10.4 (L) 20.0 - 42.0 %    Monocytes % 10.6 2.0 - 12.0 % Eosinophils % 1.0 0.0 - 6.0 %    Basophils % 0.4 0.0 - 2.0 %    Neutrophils Absolute 7.95 (H) 1.80 - 7.30 E9/L    Immature Granulocytes # 0.07 E9/L    Lymphocytes Absolute 1.08 (L) 1.50 - 4.00 E9/L    Monocytes Absolute 1.10 (H) 0.10 - 0.95 E9/L    Eosinophils Absolute 0.10 0.05 - 0.50 E9/L    Basophils Absolute 0.04 0.00 - 0.20 E9/L   Comprehensive Metabolic Panel w/ Reflex to MG   Result Value Ref Range    Sodium 138 132 - 146 mmol/L    Potassium reflex Magnesium 2.7 (LL) 3.5 - 5.0 mmol/L    Chloride 94 (L) 98 - 107 mmol/L    CO2 31 (H) 22 - 29 mmol/L    Anion Gap 13 7 - 16 mmol/L    Glucose 93 74 - 99 mg/dL    BUN 33 (H) 6 - 20 mg/dL    Creatinine 1.9 (H) 0.7 - 1.2 mg/dL    GFR Non-African American 36 >=60 mL/min/1.73    GFR African American 44     Calcium 9.5 8.6 - 10.2 mg/dL    Total Protein 7.1 6.4 - 8.3 g/dL    Albumin 3.8 3.5 - 5.2 g/dL    Total Bilirubin 2.4 (H) 0.0 - 1.2 mg/dL    Alkaline Phosphatase 116 40 - 129 U/L    ALT 8 0 - 40 U/L    AST 15 0 - 39 U/L   Troponin   Result Value Ref Range    Troponin, High Sensitivity 40 (H) 0 - 11 ng/L   Protime-INR   Result Value Ref Range    Protime 14.1 (H) 9.3 - 12.4 sec    INR 1.2    APTT   Result Value Ref Range    aPTT 29.3 24.5 - 35.1 sec   Lactic Acid   Result Value Ref Range    Lactic Acid 1.1 0.5 - 2.2 mmol/L   Magnesium   Result Value Ref Range    Magnesium 2.2 1.6 - 2.6 mg/dL   Troponin   Result Value Ref Range    Troponin, High Sensitivity 39 (H) 0 - 11 ng/L   Hemoglobin and Hematocrit   Result Value Ref Range    Hemoglobin 8.0 (L) 12.5 - 16.5 g/dL    Hematocrit 26.3 (L) 37.0 - 54.0 %   Hemoglobin and Hematocrit   Result Value Ref Range    Hemoglobin 8.5 (L) 12.5 - 16.5 g/dL    Hematocrit 28.1 (L) 37.0 - 54.0 %   Potassium   Result Value Ref Range    Potassium 2.8 (L) 3.5 - 5.0 mmol/L   Troponin   Result Value Ref Range    Troponin, High Sensitivity 41 (H) 0 - 11 ng/L   EKG 12 Lead   Result Value Ref Range    Ventricular Rate 87 BPM    Atrial Rate 300 BPM    QRS Duration 110 ms    Q-T Interval 382 ms    QTc Calculation (Bazett) 459 ms    R Axis -21 degrees    T Axis 145 degrees   TYPE AND SCREEN   Result Value Ref Range    ABO/Rh A POS     Antibody Screen NEG        Radiology:  CT CHEST W CONTRAST   Final Result   1. Heterogeneous mass associated with posterolateral right chest wall   musculature related to large intramuscular hematoma with overlying soft   tissue edema. 2. No evidence of underlying rib fracture. 3. Moderate right pleural effusion. Small left pleural effusion. 4. Atelectatic changes in right lung base   5. Pulmonary vascular congestion. 6. View of the upper abdomen shows ascites. CT ABDOMEN PELVIS W IV CONTRAST Additional Contrast? None   Final Result   1. An early cirrhotic change of the liver   2. Moderate volume of ascites within the abdomen and pelvis   3. Hematoma of the right serratus anterior muscle. 4. Sigmoid diverticulosis but no signs of diverticulitis. 5. There is marked stranding of the subcutaneous tissues along the abdominal   wall concerning for a low protein state. XR CHEST PORTABLE   Final Result   Cardiomegaly with mild pulmonary vascular congestion. No obvious airspace   opacity or pleural effusion.             ------------------------- NURSING NOTES AND VITALS REVIEWED ---------------------------  Date / Time Roomed:  8/12/2022  6:37 PM  ED Bed Assignment:  [id number]/[id number]    The nursing notes within the ED encounter and vital signs as below have been reviewed.      Patient Vitals for the past 8 hrs:   BP Temp Temp src Pulse Resp SpO2 Height Weight   08/13/22 0017 130/75 98.1 °F (36.7 °C) Oral 95 18 97 % 5' 9\" (1.753 m) 230 lb (104.3 kg)   08/12/22 2342 (!) 142/88 -- -- 84 18 96 % -- --   08/12/22 2127 (!) 153/95 -- -- 89 18 94 % -- --   08/12/22 2021 (!) 156/88 -- -- 83 18 95 % -- --   08/12/22 1835 (!) 131/94 -- -- -- 20 -- -- 220 lb (99.8 kg)   08/12/22 1828 -- 98.2 °F (36.8 °C) Oral (!) 107 -- 94 % -- --       Oxygen Saturation Interpretation: Normal    ------------------------------------------ED COURSE & MEDS GIVEN ------------------------------------------  ED Course as of 08/13/22 0221   Fri Aug 12, 2022   1910   ATTENDING PROVIDER ATTESTATION:     I have personally performed and/or participated in the history, exam, medical decision making, and procedures and agree with all pertinent clinical information unless otherwise noted. I have also reviewed and agree with the past medical, family and social history unless otherwise noted. I have discussed this patient in detail with the resident and provided the instruction and education regarding the evidence-based evaluation and treatment of chest wall ecchymosis. History: Patient presents to the ED for evaluation of bruising on his posterior right chest wall. Patient states that on Tuesday he noticed some pain in the back area. He then noticed that the area was swollen and ecchymotic. Couple days before that he had been practicing a golf swing. And prior to that he was just started on anticoagulants for new onset atrial fibrillation. He states that since then the area has become slightly more swollen and the bruising has traveled down into his pelvis on the right. Denies any significant pain on the right side of abdomen or pelvis. Had a heart cath today and was told that if his symptoms get worse to come to the ED to be evaluated. Denies any shortness of breath. Only complains mostly of pain at the site of the suspected hematoma. My findings: John Nguyễn is a 59 y.o. male whom is in no distress. Physical exam reveals patient to have an area of ecchymosis on the posterior right back. There is a firm mass underneath this area which most likely is a hematoma. There is overlying ecchymosis. Tracking down into the pelvis there is ecchymosis. Very minimal tenderness appreciated there. No diaphoresis or pallor noted.   Patient is mildly tachycardic in atrial fibrillation. My plan: Symptomatic and supportive care. Repeat labs and imaging. Electronically signed by Margot Vang DO on 8/12/22 at 7:10 PM EDT       [MS]   1926 Hemoglobin Quant(!): 9.1 [PW]   1951 Potassium(!!): 2.7 [PW]   2038 Creatinine(!): 1.9  Similar to previous [PW]   2052 CT CHEST W CONTRAST  IMPRESSION:  1. Heterogeneous mass associated with posterolateral right chest wall  musculature related to large intramuscular hematoma with overlying soft  tissue edema. 2. No evidence of underlying rib fracture. 3. Moderate right pleural effusion. Small left pleural effusion. 4. Atelectatic changes in right lung base  5. Pulmonary vascular congestion. 6. View of the upper abdomen shows ascites. [PW]   2159 Troponin, High Sensitivity(!): 39 [PW]   2159 CT ABDOMEN PELVIS W IV CONTRAST Additional Contrast? None  IMPRESSION:  1. An early cirrhotic change of the liver  2. Moderate volume of ascites within the abdomen and pelvis  3. Hematoma of the right serratus anterior muscle. 4. Sigmoid diverticulosis but no signs of diverticulitis. 5. There is marked stranding of the subcutaneous tissues along the abdominal  wall concerning for a low protein state. [PW]   5910 Resting in bed in no distress. Discussed results of imaging and labs with him. Patient's second hemoglobin dropped to 8 from 9.1. I believe that this is most likely secondary to dilution from the liter of fluid he was given. I do not feel that he is actively bleeding. He does have ascitic fluid in the belly. Abdomen soft with minimal tenderness. Do not suspect blood within the abdomen. We did speak with the radiologist and he believes that the fluid is ascitic fluid and not blood. [MS]   2247 Spoke with radiologist on the phone who states the fluid in the abdomen is most likely ascites has there are signs of liver cirrhosis and the fluid has ascitic appearance to him.     Abdomen is nontender on palpation and reexamination. [PW]      ED Course User Index  [MS] Jose Mcfadden,   [PW] Roland Harris, DO        Medications   amLODIPine (NORVASC) tablet 10 mg (has no administration in time range)   sodium chloride flush 0.9 % injection 10 mL (has no administration in time range)   sodium chloride flush 0.9 % injection 10 mL (has no administration in time range)   0.9 % sodium chloride infusion (has no administration in time range)   polyethylene glycol (GLYCOLAX) packet 17 g (has no administration in time range)   acetaminophen (TYLENOL) tablet 650 mg (has no administration in time range)     Or   acetaminophen (TYLENOL) suppository 650 mg (has no administration in time range)   potassium chloride 20 mEq/50 mL IVPB (Central Line) ( IntraVENous See Alternative 8/13/22 0133)     Or   potassium chloride 10 mEq/100 mL IVPB (Peripheral Line) (10 mEq IntraVENous New Bag 8/13/22 0133)   potassium chloride (KLOR-CON M) extended release tablet 40 mEq (40 mEq Oral Given 8/12/22 2014)   potassium chloride 10 mEq/100 mL IVPB (Peripheral Line) (0 mEq IntraVENous Stopped 8/12/22 2342)   iopamidol (ISOVUE-370) 76 % injection 50 mL (50 mLs IntraVENous Given 8/12/22 2000)       ------------------------------------------ PROGRESS NOTES ------------------------------------------  7:08 PM EDT  I have spoken with the patient and discussed todays results, in addition to providing specific details for the plan of care and counseling regarding the diagnosis and prognosis. Their questions are answered at this time and they are agreeable with the plan. I discussed at length with them reasons for immediate return here for re evaluation. They will followup with their PCP by calling their office tomorrow and were instructed to return to the ED for any new or worsening symptoms.      --------------------------------- ADDITIONAL PROVIDER NOTES ---------------------------------  At this time the patient is without objective evidence of an acute process requiring hospitalization or inpatient management. They have remained hemodynamically stable throughout their entire ED visit and are stable for discharge with outpatient follow-up. The plan has been discussed in detail and they are aware of the specific conditions for emergent return, as well as the importance of follow-up. Current Discharge Medication List          Diagnosis:  1. Hypokalemia    2. Cirrhosis of liver with ascites, unspecified hepatic cirrhosis type ([address]    3. Intramuscular hematoma    4. Bruising    5. Anemia, unspecified type    6. Pleural effusion, bilateral        Disposition:  Patient's disposition: Discharge to home  Patient's condition is stable. Darilyn Bosworth, DO       *NOTE: This report was transcribed using voice recognition software. Every effort was made to ensure accuracy; however, inadvertent computerized transcription errors may be present.        Darilyn Bosworth, DO  Resident  08/13/22 7254

## 2022-08-12 NOTE — ED NOTES
Very large Dark purple echymotic area to lower right back that wraps around to stomach and groin area. Denies injury.  Pt is on brooke Griffiths RN  08/12/22 7830

## 2022-08-13 PROBLEM — I48.19 PERSISTENT ATRIAL FIBRILLATION (HCC): Status: ACTIVE | Noted: 2022-08-13

## 2022-08-13 PROBLEM — T14.8XXA INTRAMUSCULAR HEMATOMA: Status: ACTIVE | Noted: 2022-08-13

## 2022-08-13 PROBLEM — I50.33 ACUTE ON CHRONIC HEART FAILURE WITH PRESERVED EJECTION FRACTION (HCC): Status: ACTIVE | Noted: 2022-08-13

## 2022-08-13 PROBLEM — I25.5 ISCHEMIC CARDIOMYOPATHY: Status: ACTIVE | Noted: 2022-08-13

## 2022-08-13 PROBLEM — E87.6 HYPOKALEMIA: Status: ACTIVE | Noted: 2022-08-13

## 2022-08-13 LAB
ANION GAP SERPL CALCULATED.3IONS-SCNC: 10 MMOL/L (ref 7–16)
BASOPHILS ABSOLUTE: 0.05 E9/L (ref 0–0.2)
BASOPHILS RELATIVE PERCENT: 0.6 % (ref 0–2)
BUN BLDV-MCNC: 28 MG/DL (ref 6–20)
CALCIUM SERPL-MCNC: 8.9 MG/DL (ref 8.6–10.2)
CHLORIDE BLD-SCNC: 96 MMOL/L (ref 98–107)
CO2: 30 MMOL/L (ref 22–29)
CREAT SERPL-MCNC: 1.5 MG/DL (ref 0.7–1.2)
EKG ATRIAL RATE: 300 BPM
EKG Q-T INTERVAL: 382 MS
EKG QRS DURATION: 110 MS
EKG QTC CALCULATION (BAZETT): 459 MS
EKG R AXIS: -21 DEGREES
EKG T AXIS: 145 DEGREES
EKG VENTRICULAR RATE: 87 BPM
EOSINOPHILS ABSOLUTE: 0.16 E9/L (ref 0.05–0.5)
EOSINOPHILS RELATIVE PERCENT: 1.9 % (ref 0–6)
GFR AFRICAN AMERICAN: 58
GFR NON-AFRICAN AMERICAN: 48 ML/MIN/1.73
GLUCOSE BLD-MCNC: 93 MG/DL (ref 74–99)
HCT VFR BLD CALC: 27 % (ref 37–54)
HCT VFR BLD CALC: 28.1 % (ref 37–54)
HCT VFR BLD CALC: 28.9 % (ref 37–54)
HCT VFR BLD CALC: 30.4 % (ref 37–54)
HEMOGLOBIN: 8.2 G/DL (ref 12.5–16.5)
HEMOGLOBIN: 8.5 G/DL (ref 12.5–16.5)
HEMOGLOBIN: 8.7 G/DL (ref 12.5–16.5)
HEMOGLOBIN: 9.2 G/DL (ref 12.5–16.5)
IMMATURE GRANULOCYTES #: 0.07 E9/L
IMMATURE GRANULOCYTES %: 0.8 % (ref 0–5)
LYMPHOCYTES ABSOLUTE: 1 E9/L (ref 1.5–4)
LYMPHOCYTES RELATIVE PERCENT: 11.8 % (ref 20–42)
MAGNESIUM: 2.1 MG/DL (ref 1.6–2.6)
MCH RBC QN AUTO: 25.9 PG (ref 26–35)
MCHC RBC AUTO-ENTMCNC: 30.4 % (ref 32–34.5)
MCV RBC AUTO: 85.2 FL (ref 80–99.9)
MONOCYTES ABSOLUTE: 1 E9/L (ref 0.1–0.95)
MONOCYTES RELATIVE PERCENT: 11.8 % (ref 2–12)
NEUTROPHILS ABSOLUTE: 6.21 E9/L (ref 1.8–7.3)
NEUTROPHILS RELATIVE PERCENT: 73.1 % (ref 43–80)
PDW BLD-RTO: 16.5 FL (ref 11.5–15)
PLATELET # BLD: 191 E9/L (ref 130–450)
PMV BLD AUTO: 9.8 FL (ref 7–12)
POTASSIUM REFLEX MAGNESIUM: 2.9 MMOL/L (ref 3.5–5)
POTASSIUM SERPL-SCNC: 2.8 MMOL/L (ref 3.5–5)
POTASSIUM SERPL-SCNC: 3.2 MMOL/L (ref 3.5–5)
POTASSIUM SERPL-SCNC: 3.2 MMOL/L (ref 3.5–5)
POTASSIUM SERPL-SCNC: 3.3 MMOL/L (ref 3.5–5)
RBC # BLD: 3.17 E12/L (ref 3.8–5.8)
SODIUM BLD-SCNC: 136 MMOL/L (ref 132–146)
TROPONIN, HIGH SENSITIVITY: 41 NG/L (ref 0–11)
WBC # BLD: 8.5 E9/L (ref 4.5–11.5)

## 2022-08-13 PROCEDURE — 84132 ASSAY OF SERUM POTASSIUM: CPT

## 2022-08-13 PROCEDURE — 6370000000 HC RX 637 (ALT 250 FOR IP): Performed by: INTERNAL MEDICINE

## 2022-08-13 PROCEDURE — 84484 ASSAY OF TROPONIN QUANT: CPT

## 2022-08-13 PROCEDURE — 2580000003 HC RX 258: Performed by: INTERNAL MEDICINE

## 2022-08-13 PROCEDURE — 6360000002 HC RX W HCPCS: Performed by: INTERNAL MEDICINE

## 2022-08-13 PROCEDURE — 2060000000 HC ICU INTERMEDIATE R&B

## 2022-08-13 PROCEDURE — 85025 COMPLETE CBC W/AUTO DIFF WBC: CPT

## 2022-08-13 PROCEDURE — 36415 COLL VENOUS BLD VENIPUNCTURE: CPT

## 2022-08-13 PROCEDURE — 80048 BASIC METABOLIC PNL TOTAL CA: CPT

## 2022-08-13 PROCEDURE — 85014 HEMATOCRIT: CPT

## 2022-08-13 PROCEDURE — 83735 ASSAY OF MAGNESIUM: CPT

## 2022-08-13 PROCEDURE — 99233 SBSQ HOSP IP/OBS HIGH 50: CPT | Performed by: INTERNAL MEDICINE

## 2022-08-13 PROCEDURE — 99222 1ST HOSP IP/OBS MODERATE 55: CPT | Performed by: INTERNAL MEDICINE

## 2022-08-13 PROCEDURE — 85018 HEMOGLOBIN: CPT

## 2022-08-13 RX ORDER — AMLODIPINE BESYLATE 5 MG/1
10 TABLET ORAL DAILY
Status: DISCONTINUED | OUTPATIENT
Start: 2022-08-13 | End: 2022-08-16 | Stop reason: HOSPADM

## 2022-08-13 RX ORDER — ACETAMINOPHEN 325 MG/1
650 TABLET ORAL EVERY 6 HOURS PRN
Status: DISCONTINUED | OUTPATIENT
Start: 2022-08-13 | End: 2022-08-16 | Stop reason: HOSPADM

## 2022-08-13 RX ORDER — POTASSIUM CHLORIDE 1500 MG/1
40 TABLET, FILM COATED, EXTENDED RELEASE ORAL EVERY 4 HOURS
Status: DISCONTINUED | OUTPATIENT
Start: 2022-08-13 | End: 2022-08-13 | Stop reason: CLARIF

## 2022-08-13 RX ORDER — SODIUM CHLORIDE 0.9 % (FLUSH) 0.9 %
10 SYRINGE (ML) INJECTION EVERY 12 HOURS SCHEDULED
Status: DISCONTINUED | OUTPATIENT
Start: 2022-08-13 | End: 2022-08-15 | Stop reason: SDUPTHER

## 2022-08-13 RX ORDER — SODIUM CHLORIDE 0.9 % (FLUSH) 0.9 %
10 SYRINGE (ML) INJECTION PRN
Status: DISCONTINUED | OUTPATIENT
Start: 2022-08-13 | End: 2022-08-15 | Stop reason: SDUPTHER

## 2022-08-13 RX ORDER — ACETAMINOPHEN 650 MG/1
650 SUPPOSITORY RECTAL EVERY 6 HOURS PRN
Status: DISCONTINUED | OUTPATIENT
Start: 2022-08-13 | End: 2022-08-16 | Stop reason: HOSPADM

## 2022-08-13 RX ORDER — SODIUM CHLORIDE 9 MG/ML
INJECTION, SOLUTION INTRAVENOUS PRN
Status: DISCONTINUED | OUTPATIENT
Start: 2022-08-13 | End: 2022-08-15 | Stop reason: SDUPTHER

## 2022-08-13 RX ORDER — POLYETHYLENE GLYCOL 3350 17 G/17G
17 POWDER, FOR SOLUTION ORAL DAILY PRN
Status: DISCONTINUED | OUTPATIENT
Start: 2022-08-13 | End: 2022-08-16 | Stop reason: HOSPADM

## 2022-08-13 RX ORDER — POTASSIUM CHLORIDE 20 MEQ/1
40 TABLET, EXTENDED RELEASE ORAL EVERY 4 HOURS
Status: COMPLETED | OUTPATIENT
Start: 2022-08-13 | End: 2022-08-13

## 2022-08-13 RX ORDER — POTASSIUM CHLORIDE 7.45 MG/ML
10 INJECTION INTRAVENOUS PRN
Status: DISCONTINUED | OUTPATIENT
Start: 2022-08-13 | End: 2022-08-16 | Stop reason: HOSPADM

## 2022-08-13 RX ORDER — FUROSEMIDE 10 MG/ML
40 INJECTION INTRAMUSCULAR; INTRAVENOUS 2 TIMES DAILY
Status: DISCONTINUED | OUTPATIENT
Start: 2022-08-13 | End: 2022-08-16

## 2022-08-13 RX ORDER — POTASSIUM CHLORIDE 29.8 MG/ML
20 INJECTION INTRAVENOUS PRN
Status: DISCONTINUED | OUTPATIENT
Start: 2022-08-13 | End: 2022-08-16 | Stop reason: HOSPADM

## 2022-08-13 RX ADMIN — POTASSIUM CHLORIDE 40 MEQ: 20 TABLET, EXTENDED RELEASE ORAL at 13:44

## 2022-08-13 RX ADMIN — POTASSIUM CHLORIDE 10 MEQ: 7.46 INJECTION, SOLUTION INTRAVENOUS at 02:29

## 2022-08-13 RX ADMIN — Medication 10 ML: at 11:00

## 2022-08-13 RX ADMIN — POTASSIUM CHLORIDE 10 MEQ: 7.46 INJECTION, SOLUTION INTRAVENOUS at 22:37

## 2022-08-13 RX ADMIN — FUROSEMIDE 40 MG: 10 INJECTION, SOLUTION INTRAMUSCULAR; INTRAVENOUS at 16:39

## 2022-08-13 RX ADMIN — POTASSIUM CHLORIDE 10 MEQ: 7.46 INJECTION, SOLUTION INTRAVENOUS at 06:06

## 2022-08-13 RX ADMIN — POTASSIUM CHLORIDE 10 MEQ: 7.46 INJECTION, SOLUTION INTRAVENOUS at 01:33

## 2022-08-13 RX ADMIN — POTASSIUM CHLORIDE 40 MEQ: 20 TABLET, EXTENDED RELEASE ORAL at 09:31

## 2022-08-13 RX ADMIN — POTASSIUM CHLORIDE 10 MEQ: 7.46 INJECTION, SOLUTION INTRAVENOUS at 23:52

## 2022-08-13 RX ADMIN — POTASSIUM CHLORIDE 10 MEQ: 7.46 INJECTION, SOLUTION INTRAVENOUS at 20:48

## 2022-08-13 RX ADMIN — POTASSIUM CHLORIDE 10 MEQ: 7.46 INJECTION, SOLUTION INTRAVENOUS at 04:40

## 2022-08-13 RX ADMIN — POTASSIUM CHLORIDE 10 MEQ: 7.46 INJECTION, SOLUTION INTRAVENOUS at 07:25

## 2022-08-13 RX ADMIN — Medication 10 ML: at 20:48

## 2022-08-13 RX ADMIN — AMLODIPINE BESYLATE 10 MG: 5 TABLET ORAL at 08:52

## 2022-08-13 RX ADMIN — POTASSIUM CHLORIDE 10 MEQ: 7.46 INJECTION, SOLUTION INTRAVENOUS at 03:34

## 2022-08-13 ASSESSMENT — LIFESTYLE VARIABLES
HOW MANY STANDARD DRINKS CONTAINING ALCOHOL DO YOU HAVE ON A TYPICAL DAY: PATIENT DOES NOT DRINK
HOW OFTEN DO YOU HAVE A DRINK CONTAINING ALCOHOL: NEVER

## 2022-08-13 ASSESSMENT — PAIN DESCRIPTION - PAIN TYPE: TYPE: ACUTE PAIN

## 2022-08-13 ASSESSMENT — PAIN DESCRIPTION - FREQUENCY: FREQUENCY: INTERMITTENT

## 2022-08-13 ASSESSMENT — PAIN SCALES - GENERAL
PAINLEVEL_OUTOF10: 2
PAINLEVEL_OUTOF10: 1
PAINLEVEL_OUTOF10: 0

## 2022-08-13 ASSESSMENT — PAIN - FUNCTIONAL ASSESSMENT: PAIN_FUNCTIONAL_ASSESSMENT: ACTIVITIES ARE NOT PREVENTED

## 2022-08-13 ASSESSMENT — PAIN DESCRIPTION - ONSET: ONSET: GRADUAL

## 2022-08-13 ASSESSMENT — PAIN DESCRIPTION - ORIENTATION: ORIENTATION: RIGHT

## 2022-08-13 ASSESSMENT — PAIN DESCRIPTION - LOCATION: LOCATION: FLANK;GROIN

## 2022-08-13 NOTE — PLAN OF CARE
Problem: Discharge Planning  Goal: Discharge to home or other facility with appropriate resources  Outcome: Progressing  Flowsheets (Taken 8/13/2022 0057)  Discharge to home or other facility with appropriate resources: Identify barriers to discharge with patient and caregiver     Problem: Pain  Goal: Verbalizes/displays adequate comfort level or baseline comfort level  Outcome: Progressing     Problem: Safety - Adult  Goal: Free from fall injury  Outcome: Progressing

## 2022-08-13 NOTE — PROGRESS NOTES
Patients K resulted at 2.8. notified Dr. Smith Fears that patient finished receiving iv K around midnight. Asked Dr. Smith Fears if he still wants me to follow the prn potassium protocol even though patient recently received iv k doses. He said to follow the protocol for the potassium.   [place]

## 2022-08-13 NOTE — CONSULTS
Blood and Marcelo [facility]      Patient Name: Anca Deal  YOB: 1965  PCP: No primary care provider on file. Referring Provider:      Reason for Consultation:   Chief Complaint   Patient presents with    Bleeding/Bruising     Had a stress test this am, bruising noted to right shoulder blade down back to hips with weight gain, short of breath, no chest pain        History of Present Illness: This pt is a 60 yo male who presented to the ED after seeing his cardiologist for R chest wall hematoma. He was started on Eliquis ~1 month prior for A fib/flutter, ~1 week ago, played golf, and then the next day noted a R chest hematoma below scapula, which over the next few days expanded downward. He has been off Eliquis since 8/8. He was given fluid in ER and initial Hgb of 9.1 dropped to 8.0, but has since been stable (normal baseline is ~13-14). He has had no other overt bleeding    Diagnostic Data:     Past Medical History:   Diagnosis Date    Atrial fibrillation ([address] 11/13/2014    post ACB    CAD (coronary artery disease)     Carotid artery stenosis     Chronic kidney disease     Diastolic CHF ([address] 32/61/9346    Echo 10/17/14 EF 49% stage I diastolic, 21/9/98  EF 51% with stage II diastolic chf    Hyperlipidemia     Hypertension     Obesity        Patient Active Problem List    Diagnosis Date Noted    CHF (congestive heart failure) ([address] 03/19/2015    ABLA (acute blood loss anemia) 08/12/2022    Renal insufficiency 03/22/2015    CAD (coronary artery disease) 10/03/2014        Past Surgical History:   Procedure Laterality Date    CARDIAC CATHETERIZATION  10/31/14    severe triple vessel disease    CARDIAC SURGERY      stent x 2  in 2006 [facility]    CORONARY ANGIOPLASTY      CORONARY ARTERY BYPASS GRAFT  11-5-14    x 5    DIAGNOSTIC CARDIAC CATH LAB PROCEDURE  3/21/07    [facility]: Abnormal LV status, global hypokinesis, EF 50%.  No AS, no MR. Left main luminal irregular. LAD prox 40-50% stenosis. Diag 1 prox 100% stenosis fills via collaterals from LAD. LCX prox 100% stenosis. Fills via collaterals from RCA. RCA distal 90% stenosis. PDA-right luminal irregular. RPLS prox 90% stenosis. Double vessel disease, severe. LV dysfunction mild. DIAGNOSTIC CARDIAC CATH LAB PROCEDURE  3/22/07    stent x2, [id number]: Successful PCI of RCA, native vessel    ELBOW SURGERY  1969    left negative tumor       Family History  Family History   Problem Relation Age of Onset    Heart Attack Father     Heart Surgery Father        Social History    TOBACCO:   reports that he has quit smoking. His smoking use included cigarettes. He started smoking about 22 years ago. He has never used smokeless tobacco.  ETOH:   reports no history of alcohol use. Home Medications  Prior to Admission medications    Medication Sig Start Date End Date Taking? Authorizing Provider   potassium chloride (KLOR-CON M) 10 MEQ extended release tablet Take 10 mEq by mouth in the morning and 10 mEq before bedtime. Yes Historical Provider, MD   amLODIPine (NORVASC) 10 MG tablet Take 1 tablet by mouth in the morning. Patient not taking: Reported on 8/13/2022 8/9/22   Radha Nix MD   metOLazone (ZAROXOLYN) 5 MG tablet Take 1 tablet by mouth in the morning. 8/9/22   Radha Nix MD   apixaban (ELIQUIS) 5 MG TABS tablet Take 1 tablet by mouth 2 times daily  Patient taking differently: Take 5 mg by mouth in the morning and 5 mg before bedtime. Dr. Eugenie Caro stopped this week 8/8/22. 5/24/22   Radha Nix MD   aspirin 81 MG tablet Take 81 mg by mouth daily.     Historical Provider, MD       Allergies  Allergies   Allergen Reactions    Penicillins Anaphylaxis     Pt not exactly sure of reaction    Cucumber Extract Hives and Swelling       Review of Systems:    As in HPI, otherwise negative      Objective  /86   Pulse 76   Temp 97.5 °F (36.4 °C) (Infrared)   Resp 18 Ht 5' 9\" (1.753 m)   Wt 230 lb (104.3 kg)   SpO2 96%   BMI 33.97 kg/m²     Physical Exam:   Performance Status:  General: AAO to person, place, time, in no acute distress, pleasant   Head and neck : PERRLA, EOMI . Sclera non icteric. Oropharynx : Clear  Neck: no JVD,  no adenopathy,  LYMPHATICS : No LAD  Heart: Regular rate and regular rhythm  Lungs: Clear to auscultation   Extremities: +BL LE edema  Abdomen: Soft, non-tender  Skin:  Ecchymosis extending from the R back down R abdomen and into his scrotal area  Neurologic:Cranial nerves grossly intact. No focal motor deficits     Recent Laboratory Data-   Lab Results   Component Value Date    WBC 8.5 08/13/2022    HGB 8.2 (L) 08/13/2022    HCT 27.0 (L) 08/13/2022    MCV 85.2 08/13/2022     08/13/2022    LYMPHOPCT 11.8 (L) 08/13/2022    RBC 3.17 (L) 08/13/2022    MCH 25.9 (L) 08/13/2022    MCHC 30.4 (L) 08/13/2022    RDW 16.5 (H) 08/13/2022    NEUTOPHILPCT 73.1 08/13/2022    MONOPCT 11.8 08/13/2022    BASOPCT 0.6 08/13/2022    NEUTROABS 6.21 08/13/2022    LYMPHSABS 1.00 (L) 08/13/2022    MONOSABS 1.00 (H) 08/13/2022    EOSABS 0.16 08/13/2022    BASOSABS 0.05 08/13/2022       Lab Results   Component Value Date     08/13/2022    K 3.3 (L) 08/13/2022    CL 96 (L) 08/13/2022    CO2 30 (H) 08/13/2022    BUN 28 (H) 08/13/2022    CREATININE 1.5 (H) 08/13/2022    GLUCOSE 93 08/13/2022    CALCIUM 8.9 08/13/2022    PROT 7.1 08/12/2022    LABALBU 3.8 08/12/2022    BILITOT 2.4 (H) 08/12/2022    ALKPHOS 116 08/12/2022    AST 15 08/12/2022    ALT 8 08/12/2022    LABGLOM 48 08/13/2022    GFRAA 58 08/13/2022       No results found for: IRON, TIBC, FERRITIN        Radiology-    CT CHEST W CONTRAST   Final Result   1. Heterogeneous mass associated with posterolateral right chest wall   musculature related to large intramuscular hematoma with overlying soft   tissue edema. 2. No evidence of underlying rib fracture. 3. Moderate right pleural effusion.   Small left pleural effusion. 4. Atelectatic changes in right lung base   5. Pulmonary vascular congestion. 6. View of the upper abdomen shows ascites. CT ABDOMEN PELVIS W IV CONTRAST Additional Contrast? None   Final Result   1. An early cirrhotic change of the liver   2. Moderate volume of ascites within the abdomen and pelvis   3. Hematoma of the right serratus anterior muscle. 4. Sigmoid diverticulosis but no signs of diverticulitis. 5. There is marked stranding of the subcutaneous tissues along the abdominal   wall concerning for a low protein state. XR CHEST PORTABLE   Final Result   Cardiomegaly with mild pulmonary vascular congestion. No obvious airspace   opacity or pleural effusion. ASSESSMENT/PLAN :  58 yo male  R chest hematoma/ecchymosis  Hx of acute on chronic DHF    - On Eliquis for A fib/flutter. Started 1 month ago. No significant trauma, but developed a hematoma which has expanded downward. Has been on Eliquis since 8/8/22  - Coag from 8/12 with mildly increased PT of 14.1, INR 1.2, PTT of 29  - Hgb has been stable since yesterday. Likely no ongoing bleeding at this time point, but would continue to monitor Hgb  - I doubt a primary bleeding diathesis, as he has never had a clinically significant bleeding episode in the past (including multiple surgical procedures). Normal platelet count. BUN is slightly elevated form CKD, but not enough to cause uremic platelet dysfunction.  Coagulation profile is not significantly impaired as above  - The mildly elevated bili is likely mostly indirect, and not related to intravascular hemolysis but break down of the hematoma  - Will check PFA, mixing study and vW profile  - Most likely scenario is a muscle tear with subsequent bleeding and hematoma expansion downward while on an agent which is known to cause increased risk for bleeding  - Will defer to cardiology, but if patient must resume Eliquis, may be better to have him on 2.5 mg BID rather than 5 mg BID    Thank you for this consult.  Please call with further questions or concerns      Electronically signed by Bob Arora MD on 8/13/2022 at 11:42 AM

## 2022-08-13 NOTE — CONSULTS
reaction    Cucumber Extract Hives and Swelling       Current Facility-Administered Medications   Medication Dose Route Frequency Provider Last Rate Last Admin    amLODIPine (NORVASC) tablet 10 mg  10 mg Oral Daily Suzy Mcgrath MD   10 mg at 08/13/22 0466    sodium chloride flush 0.9 % injection 10 mL  10 mL IntraVENous 2 times per day Suzy Mcgrath MD        sodium chloride flush 0.9 % injection 10 mL  10 mL IntraVENous PRN Suzy Mcgrath MD        0.9 % sodium chloride infusion   IntraVENous PRN Suzy Mcgrath MD        polyethylene glycol (GLYCOLAX) packet 17 g  17 g Oral Daily PRN Suzy Mcgrath MD        acetaminophen (TYLENOL) tablet 650 mg  650 mg Oral Q6H PRN Suzy Mcgrath MD        Or    acetaminophen (TYLENOL) suppository 650 mg  650 mg Rectal Q6H PRN Suzy Mcgrath MD        potassium chloride 20 mEq/50 mL IVPB (Central Line)  20 mEq IntraVENous PRN Suzy Mcgrath MD        Or    potassium chloride 10 mEq/100 mL IVPB (Peripheral Line)  10 mEq IntraVENous PRN Suzy Mcgrath  mL/hr at 08/13/22 0725 10 mEq at 08/13/22 0725    potassium chloride (KLOR-CON M) extended release tablet 40 mEq  40 mEq Oral Q4H Miguelina Wadsworth MD   40 mEq at 08/13/22 9281       Social History     Socioeconomic History    Marital status:      Spouse name: Not on file    Number of children: 2    Years of education: Not on file    Highest education level: Not on file   Occupational History    Not on file   Tobacco Use    Smoking status: Former     Types: Cigarettes     Start date: 1/1/2000    Smokeless tobacco: Never   Vaping Use    Vaping Use: Never used   Substance and Sexual Activity    Alcohol use: No     Comment: once or twice a month     Drug use: No    Sexual activity: Not Currently   Other Topics Concern    Not on file   Social History Narrative    Not on file     Social Determinants of Health     Financial Resource Strain: Not on file   Food Insecurity: Not on file   Transportation Needs: Not on file   Physical Activity: Not on file   Stress: Not on file   Social Connections: Not on file   Intimate Partner Violence: Not on file   Housing Stability: Not on file       Family History   Problem Relation Age of Onset    Heart Attack Father     Heart Surgery Father        Review of Systems:   Heart: as above   Lungs: as above   Eyes: denies changes in vision or discharge. Ears: denies changes in hearing or pain. Nose: denies epistaxis or masses   Throat: denies sore throat or trouble swallowing. Neuro: denies numbness, tingling, tremors. Skin: denies rashes or itching. : denies hematuria, dysuria   GI: denies vomiting, diarrhea   Psych: denies mood changed, anxiety, depression. all others negative. Physical Exam   /86   Pulse 76   Temp 97.5 °F (36.4 °C) (Infrared)   Resp 18   Ht 5' 9\" (1.753 m)   Wt 230 lb (104.3 kg)   SpO2 96%   BMI 33.97 kg/m²   Constitutional: Oriented to person, place, and time. Well-developed and well-nourished. No distress. Head: Normocephalic and atraumatic. Eyes: EOM are normal. Pupils are equal, round, and reactive to light. Neck: Normal range of motion. Neck supple. JVD elevated to 13 cm. carotid bruit is not present. No tracheal deviation present. No thyromegaly present. Cardiovascular: Normal rate, regular rhythm, normal heart sounds and intact distal pulses. Exam reveals no gallop and no friction rub. No murmur heard. Pulmonary/Chest: Effort normal and breath sounds normal. No respiratory distress. No wheezes. No rales. Right-sided chest wall tenderness with significant bruising posteriorly. Abdominal: Soft. Bowel sounds are normal. No distension and no mass. No tenderness. No rebound and no guarding. Musculoskeletal: Normal range of motion. 1-2+ edema and no tenderness. Lymphadenopathy:   No cervical adenopathy. No groin adenopathy. Neurological: Alert and oriented to person, place, and time. Skin: Skin is warm and dry.  No rash noted. Not diaphoretic. No erythema. Psychiatric: Normal mood and affect. Behavior is normal.     CBC:   Lab Results   Component Value Date/Time    WBC 8.5 08/13/2022 05:32 AM    RBC 3.17 08/13/2022 05:32 AM    HGB 8.2 08/13/2022 05:32 AM    HCT 27.0 08/13/2022 05:32 AM    MCV 85.2 08/13/2022 05:32 AM    MCH 25.9 08/13/2022 05:32 AM    MCHC 30.4 08/13/2022 05:32 AM    RDW 16.5 08/13/2022 05:32 AM     08/13/2022 05:32 AM    MPV 9.8 08/13/2022 05:32 AM     BMP:   Lab Results   Component Value Date/Time     08/13/2022 05:32 AM    K 3.3 08/13/2022 09:42 AM    K 2.9 08/13/2022 05:32 AM    CL 96 08/13/2022 05:32 AM    CO2 30 08/13/2022 05:32 AM    BUN 28 08/13/2022 05:32 AM    LABALBU 3.8 08/12/2022 07:14 PM    LABALBU 4.3 04/25/2011 08:55 AM    CREATININE 1.5 08/13/2022 05:32 AM    CALCIUM 8.9 08/13/2022 05:32 AM    GFRAA 58 08/13/2022 05:32 AM    LABGLOM 48 08/13/2022 05:32 AM     Magnesium:    Lab Results   Component Value Date/Time    MG 2.1 08/13/2022 05:32 AM     Cardiac Enzymes:   Lab Results   Component Value Date    CKTOTAL 128 11/18/2014    CKTOTAL 332 (H) 04/25/2011    CKMB 1.7 04/25/2011    TROPHS 41 (H) 08/13/2022    TROPHS 39 (H) 08/12/2022    TROPHS 40 (H) 08/12/2022      PT/INR:    Lab Results   Component Value Date/Time    PROTIME 14.1 08/12/2022 07:14 PM    INR 1.2 08/12/2022 07:14 PM     TSH:    Lab Results   Component Value Date/Time    TSH 3.170 08/05/2022 02:22 PM       Rhythm Strip: Reviewed. Shows atrial fibrillation with rates ranging from 80 to 100 bpm.    EKG: Done and independently reviewed by me. Shows atrial fibrillation. Nonspecific ST changes. Occasional PVCs. Prior cardiac workup:  ECHO: 7/19/2022,   Left ventricular internal dimensions were normal in diastole and systole. EF 50%. Moderate left ventricular concentric hypertrophy noted. Abnormal (paradoxical) motion consistent with conduction abnormality. Low normal left ventricular systolic function.    The left atrium is severely dilated. Moderately enlarged right atrium size. Normal mitral valve leaflet thickness with apical tethering. Moderate centrally directed mitral regurgitation. Physiologic and/or trace aortic regurgitation is noted. Mild tricuspid regurgitation. Pulmonary hypertension is moderate . There is a trivial circumferential pericardial effusion noted. 5/11/15 Summary   Compared to prior echo, no changes noted. Technically adequate study. Mild asymmetric left ventricular hypertrophy   Ejection fraction is visually estimated at 50%. Mild global wall hypokinesis. There is doppler evidence of stage III diastolic dysfunction. Right ventricle global systolic function is mildly reduced . Mild tricuspid regurgitation. RVSP was not measured. Stress Test:     Angiography: 10/31/14 IMPRESSION:  Severe triple-vessel disease. MPI from 8/12/22:  Impression:    Electrocardiographically normal regadenoson infusion with a clinically nonischemic response. Myocardial perfusion imaging was abnormal.    The abnormality was a a small sized fixed defect in the mid inferior wall suggestive of a prior MI  Overall left ventricular systolic function mildly reduced at 45-50%, with mild global hypokinesis, abnormal septal motion. 4.   Low to Intermediate risk general pharmacologic stress test    IMPRESSION:  1. Acute decompensated diastolic heart failure: He appears hypervolemic on examination. We will start Lasix 40 IV twice daily. Monitor daily weights, renal function and urine output. BNP from 8/5/2022 elevated at 7118. We will check BNP tomorrow. Blood pressure management as below  2. Atrial fibrillation: Persistent, recently diagnosed, controlled ventricular response, [id number] score of 3, on chronic anticoagulation with Eliquis 5 mg by mouth twice daily. Currently, the Eliquis is being held on account of his hematoma.   Once he is able to be on full dose anticoagulation, we will plan on a THANH guided cardioversion as he will certainly benefit from rhythm control strategy. He does have a history of sleep apnea but has not been using his CPAP in the last year. He will need a follow-up in the sleep clinic and will need compliance with CPAP. Not on any rate control therapy at this time. 3. Hypertension: On Norvasc 10 mg daily. We will continue that here. Pressures are stable currently. 4.  Coronary artery disease status post CABG:left internal mammary artery to the LAD, saphenous vein graft to the diagonal branch of the LAD, saphenous vein graft to the posterolateral with a Y graft to the PDA, and the left radial artery to the obtuse marginal, s/p remote PCI to RCA and LAD. Underwent a nuclear perfusion scan in the office yesterday. Shows infarct without ischemia. We will continue with medical management for now. He is currently not on statin therapy. We will check lipid panel in the morning. We will also start low-dose aspirin when able. 5.  Mitral valve regurgitation: Moderate. Volume status management as above. We will continue to monitor. 6.  Tricuspid valve regurgitation: Mild  7. Chronic anticoagulation  8. Stage III chronic kidney disease          Tod Funk MD  [facility]) Cardiology     NOTE: This report was transcribed using voice recognition software. Every effort was made to ensure accuracy; however, inadvertent computerized transcription errors may be present.

## 2022-08-13 NOTE — PROGRESS NOTES
[address] [place], [place] [address]ist   Progress Note    Admitting Date and Time: 8/12/2022  6:37 PM  Admit Dx: Hypokalemia [E87.6]  ABLA (acute blood loss anemia) [D62]    Subjective:    Pt feels better. States he became concerned when bruising from right side kept expanding and then when became alarmed when it went into groin. Per RN: patient feeling swollen area on right side is expanding. amLODIPine  10 mg Oral Daily    sodium chloride flush  10 mL IntraVENous 2 times per day    potassium chloride  40 mEq Oral Q4H     sodium chloride flush, 10 mL, PRN  sodium chloride, , PRN  polyethylene glycol, 17 g, Daily PRN  acetaminophen, 650 mg, Q6H PRN   Or  acetaminophen, 650 mg, Q6H PRN  potassium chloride, 20 mEq, PRN   Or  potassium chloride, 10 mEq, PRN         Objective:    /86   Pulse 76   Temp 97.5 °F (36.4 °C) (Infrared)   Resp 18   Ht 5' 9\" (1.753 m)   Wt 230 lb (104.3 kg)   SpO2 96%   BMI 33.97 kg/m²   (Seen and examined with Dr. Robbin Bautista)  General Appearance: alert and oriented to person, place and time and in no acute distress  Skin: Extensive ecchymosis starting from the right proximal mid axillary line extending down to his flank into the groin and abdomen.   See enclosed pictures from ER visit  Head: normocephalic and atraumatic  Eyes: pupils equal, round, and reactive to light, extraocular eye movements intact, conjunctivae normal  Neck: neck supple and non tender without mass   Pulmonary/Chest: clear to auscultation bilaterally- no wheezes, rales or rhonchi, normal air movement, no respiratory distress  Cardiovascular: normal rate, normal S1 and S2 and no carotid bruits  Abdomen: soft, non-tender, non-distended, normal bowel sounds, no masses or organomegaly  Musculoskeletal: Large palpable hematoma in the proximal right mid axillary line but the bulk of the hematoma is posterior to the mid axillary line  Extremities: chronic venous stasis changes with some edema noted  Neurologic: no cranial nerve deficit and speech normal      Recent Labs     08/12/22 1914 08/13/22  0033 08/13/22  0532 08/13/22  0942 08/13/22  1221     --  136  --   --    K 2.7*   < > 2.9* 3.3* 3.2*   CL 94*  --  96*  --   --    CO2 31*  --  30*  --   --    BUN 33*  --  28*  --   --    CREATININE 1.9*  --  1.5*  --   --    GLUCOSE 93  --  93  --   --    CALCIUM 9.5  --  8.9  --   --     < > = values in this interval not displayed. Recent Labs     08/12/22 1914   ALKPHOS 116   PROT 7.1   LABALBU 3.8   BILITOT 2.4*   AST 15   ALT 8       Recent Labs     08/12/22 1914 08/12/22 2216 08/13/22  0033 08/13/22  0532 08/13/22  1221   WBC 10.3  --   --  8.5  --    RBC 3.51*  --   --  3.17*  --    HGB 9.1*   < > 8.5* 8.2* 9.2*   HCT 29.8*   < > 28.1* 27.0* 30.4*   MCV 84.9  --   --  85.2  --    MCH 25.9*  --   --  25.9*  --    MCHC 30.5*  --   --  30.4*  --    RDW 16.5*  --   --  16.5*  --      --   --  191  --    MPV 9.3  --   --  9.8  --     < > = values in this interval not displayed. Latest Reference Range & Units 8/12/22 19:14 8/12/22 21:00 8/13/22 00:33   Troponin, High Sensitivity 0 - 11 ng/L 40 (H) 39 (H) 41 (H)   (H): Data is abnormally high    Radiology:   CT CHEST W CONTRAST   Final Result   1. Heterogeneous mass associated with posterolateral right chest wall   musculature related to large intramuscular hematoma with overlying soft   tissue edema. 2. No evidence of underlying rib fracture. 3. Moderate right pleural effusion. Small left pleural effusion. 4. Atelectatic changes in right lung base   5. Pulmonary vascular congestion. 6. View of the upper abdomen shows ascites. CT ABDOMEN PELVIS W IV CONTRAST Additional Contrast? None   Final Result   1. An early cirrhotic change of the liver   2. Moderate volume of ascites within the abdomen and pelvis   3. Hematoma of the right serratus anterior muscle. 4. Sigmoid diverticulosis but no signs of diverticulitis.    5. There is marked stranding of the subcutaneous tissues along the abdominal   wall concerning for a low protein state. XR CHEST PORTABLE   Final Result   Cardiomegaly with mild pulmonary vascular congestion. No obvious airspace   opacity or pleural effusion. Assessment:  Principal Problem:    ABLA (acute blood loss anemia)  Resolved Problems:    * No resolved hospital problems. *      Plan:      Acute blood loss anemia due to intramuscular hematoma in the setting of right serratus anterior muscle tear on chronic anticoagulation/probable underlying liver disease  -Continue to follow hemoglobin and transfuse as needed.  -Hold anticoagulation.  -hem/onc consulted to evaluate for any underlying blood diathesis might have contributed to significant bleeding on Eliquis. 2. Acute on chronic diastolic heart failure  -Cardiology consulted and will be diuresing patient.   -Echo from July 19, 2022 revealed EF 50% with indeterminate diastolic function  -Lexiscan nuclear stress test 8/12 and showed small size fixed defect in the mid inferior wall suggestive of prior MI.  EF was 45 to 50% with mild global hypokinesis with abnormal septal motion. Overall, low to intermediate risk test.    3.  Hypokalemia  -Patient received IV potassium 10 mEq x 3 in the ER and then overnight received 5 but IV infiltrated on the sixth bag. He also received 40 mEq in the ER and I will repeat that x2 today. Magnesium within normal limits at 2.1.   -Continue to follow K. Initial K 2.7 but now up to 3.2.     4. CKD stage 3B  -monitor creatinine with diuresis. Case discussed with Dr. Froy Blanton of hem/onc in patient's room. Case discussed with Dr. Nick Matson of [address] [place] [address] Cardiology in room. NOTE: This report was transcribed using voice recognition software. Every effort was made to ensure accuracy; however, inadvertent computerized transcription errors may be present.      Electronically signed by Ijeoma Smith MD on 8/13/2022 at 1:24 PM

## 2022-08-13 NOTE — H&P
[facility] History and Physical      CHIEF COMPLAINT:  bruising    History of Present Illness: 20-year-old female with a history of atherosclerotic heart disease, diastolic heart failure, atrial fibrillation. Approximately 1 month ago he was started on Eliquis. Over the weekend he played golf. He states it was not intense, he did a few swings and did not have any significant pain or trauma that he knows of. On Monday he developed a hematoma on the right side of his chest below his scapula. Since then his hematoma has spread downwards. He stopped Eliquis 8/8. He was sent by his cardiologist today due to persistently expanding hematoma. On presentation his hemoglobin was 9.1. Repeat a few hours later was 8 but he received fluids during that time. He reports having significant weight gain of around 25 pounds in the past month. He was on Bumex and reports that this was switched to metolazone but did not lead to adequate diuresis. He does report shortness of breath with ambulation. He had an echo recently and a stress test today that showed no significant change. Denies any change in his diet. CT scan in the ED showed pleural effusion and ascites. Reportedly the radiologist believe the fluid is consistent with water and not blood.       REVIEW OF SYSTEMS:  A comprehensive 14 point review of systems was negative except for: what is in the HPI    PMH:  Past Medical History:   Diagnosis Date    Atrial fibrillation ([facility] [address]  11-13-14    post ACB    CAD (coronary artery disease)     Carotid artery stenosis     Chronic kidney disease     Diastolic CHF ([address] 62/96/90    Echo 10/17/14 EF 71% stage I diastolic, 60/5/84  EF 41% with stage II diastolic chf    Hyperlipidemia     Hypertension     Obesity        Surgical History:  Past Surgical History:   Procedure Laterality Date    CARDIAC CATHETERIZATION  10/31/14    severe triple vessel disease    CARDIAC SURGERY      stent x 2  in 2006 [facility]    CORONARY ANGIOPLASTY      CORONARY ARTERY BYPASS GRAFT  11-5-14    x 5    DIAGNOSTIC CARDIAC CATH LAB PROCEDURE  3/21/07    [facility]: Abnormal LV status, global hypokinesis, EF 50%. No AS, no MR. Left main luminal irregular. LAD prox 40-50% stenosis. Diag 1 prox 100% stenosis fills via collaterals from LAD. LCX prox 100% stenosis. Fills via collaterals from RCA. RCA distal 90% stenosis. PDA-right luminal irregular. RPLS prox 90% stenosis. Double vessel disease, severe. LV dysfunction mild. DIAGNOSTIC CARDIAC CATH LAB PROCEDURE  3/22/07    stent x2, [facility]: Successful PCI of RCA, native vessel    ELBOW SURGERY  1969    left negative tumor       Medications Prior to Admission:    Prior to Admission medications    Medication Sig Start Date End Date Taking? Authorizing Provider   potassium chloride (KLOR-CON M) 10 MEQ extended release tablet Take 10 mEq by mouth in the morning and 10 mEq before bedtime. Yes Historical Provider, MD   amLODIPine (NORVASC) 10 MG tablet Take 1 tablet by mouth in the morning. 8/9/22   Alfredo Bunn MD   metOLazone (ZAROXOLYN) 5 MG tablet Take 1 tablet by mouth in the morning. 8/9/22   Alfredo Bunn MD   apixaban (ELIQUIS) 5 MG TABS tablet Take 1 tablet by mouth 2 times daily  Patient taking differently: Take 5 mg by mouth in the morning and 5 mg before bedtime. Dr. Brittani Baeza stopped this week 8/8/22. 5/24/22   Alfredo Bunn MD   aspirin 81 MG tablet Take 81 mg by mouth daily. Historical Provider, MD       Allergies:    Penicillins and Cucumber extract    Social History:    reports that he has quit smoking. His smoking use included cigarettes. He started smoking about 22 years ago. He has never used smokeless tobacco. He reports that he does not drink alcohol and does not use drugs. Family History:   family history includes Heart Attack in his father; Heart Surgery in his father.        PHYSICAL EXAM:  Vitals:  BP (!) 153/95 Pulse 89   Temp 98.2 °F (36.8 °C) (Oral)   Resp 18   Wt 220 lb (99.8 kg)   SpO2 94%   BMI 32.49 kg/m²   General Appearance: alert and oriented to person, place and time and in no acute distress  Skin: warm and dry, turgor not diminished  Head: normocephalic and atraumatic  Eyes: pupils equal, round, and reactive to light, extraocular eye movements intact, conjunctivae normal  Neck: neck supple and non tender without mass   Pulmonary/Chest: clear to auscultation bilaterally- no wheezes, rales or rhonchi, normal air movement, no respiratory distress  Cardiovascular: normal rate, normal S1 and S2 and no M/R/R, JVD elevated  Abdomen: soft, non-tender, non-distended, normal bowel sounds, no masses or organomegaly. Has ecchymosis extending from the right side of his back down across the right side of his abdomen and into his scrotum. Extremities: no cyanosis, no clubbing and +2 edema  Neurologic: no cranial nerve deficit and speech normal        LABS:  Recent Labs     08/12/22 1914      K 2.7*   CL 94*   CO2 31*   BUN 33*   CREATININE 1.9*   GLUCOSE 93   CALCIUM 9.5       Recent Labs     08/12/22 1914 08/12/22  2216   WBC 10.3  --    RBC 3.51*  --    HGB 9.1* 8.0*   HCT 29.8* 26.3*   MCV 84.9  --    MCH 25.9*  --    MCHC 30.5*  --    RDW 16.5*  --      --    MPV 9.3  --        No results for input(s): POCGLU in the last 72 hours. Radiology:   CT CHEST W CONTRAST   Final Result   1. Heterogeneous mass associated with posterolateral right chest wall   musculature related to large intramuscular hematoma with overlying soft   tissue edema. 2. No evidence of underlying rib fracture. 3. Moderate right pleural effusion. Small left pleural effusion. 4. Atelectatic changes in right lung base   5. Pulmonary vascular congestion. 6. View of the upper abdomen shows ascites. CT ABDOMEN PELVIS W IV CONTRAST Additional Contrast? None   Final Result   1. An early cirrhotic change of the liver   2. Moderate volume of ascites within the abdomen and pelvis   3. Hematoma of the right serratus anterior muscle. 4. Sigmoid diverticulosis but no signs of diverticulitis. 5. There is marked stranding of the subcutaneous tissues along the abdominal   wall concerning for a low protein state. XR CHEST PORTABLE   Final Result   Cardiomegaly with mild pulmonary vascular congestion. No obvious airspace   opacity or pleural effusion. EKG: No acute normality    ASSESSMENT/PLAN:  Acute blood loss anemia  Intramuscular hematoma: In posterolateral chest wall and right serratus anterior muscle  Ecchymosis  - Continue to hold anticoagulation  - Drop in hemoglobin likely dilutional.  BP stable. Hold IV fluids for now. Trend hemoglobin. If it continues to drop may need CTA and possible IR for embolization. Otherwise allow to resolve    Acute on chronic diastolic heart failure  Moderate mitral regurgitation  Hypokalemia  Pleural effusion  Ascites  -Refractory to Bumex and metolazone as an outpatient but not concurrently  - Tonight we will hold diuresis. Replace potassium  - Once his hemoglobin is stable he will need to be started on aggressive diuresis  -No chemical evidence of cirrhosis. Ascites is likely cardiac. Essential hypertension  - On Norvasc 10 mg daily. Continue for now    Persistent atrial fibrillation/flutter  - Does not appear to be on metoprolol anymore  -Hold anticoagulation as above    Atherosclerotic heart disease status post CABG  CKD stage 3: cr at baseline    Code Status: Full  DVT prophylaxis: Hold      NOTE: This report was transcribed using voice recognition software. Every effort was made to ensure accuracy; however, inadvertent computerized transcription errors may be present.   Electronically signed by Bernardino Bautista MD on 8/12/2022 at 10:56 PM

## 2022-08-14 LAB
ANION GAP SERPL CALCULATED.3IONS-SCNC: 10 MMOL/L (ref 7–16)
BASOPHILS ABSOLUTE: 0.05 E9/L (ref 0–0.2)
BASOPHILS RELATIVE PERCENT: 0.5 % (ref 0–2)
BUN BLDV-MCNC: 27 MG/DL (ref 6–20)
CALCIUM SERPL-MCNC: 8.9 MG/DL (ref 8.6–10.2)
CHLORIDE BLD-SCNC: 97 MMOL/L (ref 98–107)
CO2: 32 MMOL/L (ref 22–29)
COLLAGEN ADENOSINE-5'-DIPHOSPHATE (ADP) TIME: 101 SEC (ref 48–103)
COLLAGEN EPINEPHRINE TIME: ABNORMAL SEC (ref 83–176)
CREAT SERPL-MCNC: 1.6 MG/DL (ref 0.7–1.2)
EOSINOPHILS ABSOLUTE: 0.31 E9/L (ref 0.05–0.5)
EOSINOPHILS RELATIVE PERCENT: 3.2 % (ref 0–6)
GFR AFRICAN AMERICAN: 54
GFR NON-AFRICAN AMERICAN: 44 ML/MIN/1.73
GLUCOSE BLD-MCNC: 93 MG/DL (ref 74–99)
HCT VFR BLD CALC: 28.5 % (ref 37–54)
HEMOGLOBIN: 8.6 G/DL (ref 12.5–16.5)
IMMATURE GRANULOCYTES #: 0.11 E9/L
IMMATURE GRANULOCYTES %: 1.1 % (ref 0–5)
LYMPHOCYTES ABSOLUTE: 0.89 E9/L (ref 1.5–4)
LYMPHOCYTES RELATIVE PERCENT: 9.3 % (ref 20–42)
MAGNESIUM: 2 MG/DL (ref 1.6–2.6)
MCH RBC QN AUTO: 25.8 PG (ref 26–35)
MCHC RBC AUTO-ENTMCNC: 30.2 % (ref 32–34.5)
MCV RBC AUTO: 85.6 FL (ref 80–99.9)
MONOCYTES ABSOLUTE: 0.96 E9/L (ref 0.1–0.95)
MONOCYTES RELATIVE PERCENT: 10 % (ref 2–12)
NEUTROPHILS ABSOLUTE: 7.28 E9/L (ref 1.8–7.3)
NEUTROPHILS RELATIVE PERCENT: 75.9 % (ref 43–80)
PDW BLD-RTO: 16.7 FL (ref 11.5–15)
PLATELET # BLD: 218 E9/L (ref 130–450)
PMV BLD AUTO: 9.6 FL (ref 7–12)
POTASSIUM REFLEX MAGNESIUM: 2.9 MMOL/L (ref 3.5–5)
PRO-BNP: 2811 PG/ML (ref 0–125)
RBC # BLD: 3.33 E12/L (ref 3.8–5.8)
SODIUM BLD-SCNC: 139 MMOL/L (ref 132–146)
WBC # BLD: 9.6 E9/L (ref 4.5–11.5)

## 2022-08-14 PROCEDURE — 36415 COLL VENOUS BLD VENIPUNCTURE: CPT

## 2022-08-14 PROCEDURE — 83735 ASSAY OF MAGNESIUM: CPT

## 2022-08-14 PROCEDURE — 99233 SBSQ HOSP IP/OBS HIGH 50: CPT | Performed by: INTERNAL MEDICINE

## 2022-08-14 PROCEDURE — 80048 BASIC METABOLIC PNL TOTAL CA: CPT

## 2022-08-14 PROCEDURE — 85025 COMPLETE CBC W/AUTO DIFF WBC: CPT

## 2022-08-14 PROCEDURE — 85245 CLOT FACTOR VIII VW RISTOCTN: CPT

## 2022-08-14 PROCEDURE — 85246 CLOT FACTOR VIII VW ANTIGEN: CPT

## 2022-08-14 PROCEDURE — 6370000000 HC RX 637 (ALT 250 FOR IP): Performed by: INTERNAL MEDICINE

## 2022-08-14 PROCEDURE — 85611 PROTHROMBIN TEST: CPT

## 2022-08-14 PROCEDURE — 85576 BLOOD PLATELET AGGREGATION: CPT

## 2022-08-14 PROCEDURE — 85240 CLOT FACTOR VIII AHG 1 STAGE: CPT

## 2022-08-14 PROCEDURE — 6360000002 HC RX W HCPCS: Performed by: INTERNAL MEDICINE

## 2022-08-14 PROCEDURE — 2580000003 HC RX 258: Performed by: INTERNAL MEDICINE

## 2022-08-14 PROCEDURE — 2060000000 HC ICU INTERMEDIATE R&B

## 2022-08-14 PROCEDURE — 83880 ASSAY OF NATRIURETIC PEPTIDE: CPT

## 2022-08-14 RX ORDER — POTASSIUM CHLORIDE 20 MEQ/1
40 TABLET, EXTENDED RELEASE ORAL EVERY 4 HOURS
Status: COMPLETED | OUTPATIENT
Start: 2022-08-14 | End: 2022-08-14

## 2022-08-14 RX ORDER — POTASSIUM CHLORIDE 20 MEQ/1
40 TABLET, EXTENDED RELEASE ORAL 2 TIMES DAILY WITH MEALS
Status: DISCONTINUED | OUTPATIENT
Start: 2022-08-15 | End: 2022-08-15

## 2022-08-14 RX ADMIN — FUROSEMIDE 40 MG: 10 INJECTION, SOLUTION INTRAMUSCULAR; INTRAVENOUS at 17:48

## 2022-08-14 RX ADMIN — Medication 10 ML: at 09:00

## 2022-08-14 RX ADMIN — AMLODIPINE BESYLATE 10 MG: 5 TABLET ORAL at 08:59

## 2022-08-14 RX ADMIN — POTASSIUM CHLORIDE 40 MEQ: 1500 TABLET, EXTENDED RELEASE ORAL at 14:23

## 2022-08-14 RX ADMIN — POTASSIUM CHLORIDE 40 MEQ: 1500 TABLET, EXTENDED RELEASE ORAL at 10:38

## 2022-08-14 RX ADMIN — Medication 10 ML: at 19:55

## 2022-08-14 RX ADMIN — POTASSIUM CHLORIDE 10 MEQ: 7.46 INJECTION, SOLUTION INTRAVENOUS at 01:13

## 2022-08-14 RX ADMIN — FUROSEMIDE 40 MG: 10 INJECTION, SOLUTION INTRAMUSCULAR; INTRAVENOUS at 08:59

## 2022-08-14 ASSESSMENT — PAIN SCALES - GENERAL: PAINLEVEL_OUTOF10: 0

## 2022-08-14 NOTE — PROGRESS NOTES
INPATIENT CARDIOLOGY FOLLOW-UP    Name: Harjeet Best    Age: 59 y.o. Date of Admission: 8/12/2022  6:37 PM    Date of Service: 8/14/2022    Primary Cardiologist: Dr. Rika Mccall    Chief Complaint: Follow-up for atrial fibrillation, decompensated heart failure    Interim History:  No new overnight cardiac complaints. Currently with no complaints of CP, SOB, palpitations, dizziness, or lightheadedness.   Atrial fibrillation with rates in the 90s    Review of Systems:   Negative except as described above    Problem List:  Patient Active Problem List   Diagnosis    CAD (coronary artery disease)    Acute decompensated heart failure (HCC)    Renal insufficiency    ABLA (acute blood loss anemia)    Stage 3b chronic kidney disease (HCC)    Hypokalemia    Intramuscular hematoma    Persistent atrial fibrillation ([facility] [address]    Ischemic cardiomyopathy    Acute on chronic heart failure with preserved ejection fraction (HCC)       Current Medications:    Current Facility-Administered Medications:     potassium chloride (KLOR-CON M) extended release tablet 40 mEq, 40 mEq, Oral, Q4H, Daryl Oliveira MD, 40 mEq at 08/14/22 1038    [START ON 8/15/2022] potassium chloride (KLOR-CON M) extended release tablet 40 mEq, 40 mEq, Oral, BID WC, Daryl Oliveira MD    amLODIPine (NORVASC) tablet 10 mg, 10 mg, Oral, Daily, Suzy Mcgrath MD, 10 mg at 08/14/22 0859    sodium chloride flush 0.9 % injection 10 mL, 10 mL, IntraVENous, 2 times per day, Suzy Mcgrath MD, 10 mL at 08/14/22 0900    sodium chloride flush 0.9 % injection 10 mL, 10 mL, IntraVENous, PRN, Suzy Mcgrath MD    0.9 % sodium chloride infusion, , IntraVENous, PRN, Suzy Mcgrath MD    polyethylene glycol (GLYCOLAX) packet 17 g, 17 g, Oral, Daily PRN, Suzy Mcgrath MD    acetaminophen (TYLENOL) tablet 650 mg, 650 mg, Oral, Q6H PRN **OR** acetaminophen (TYLENOL) suppository 650 mg, 650 mg, Rectal, Q6H PRN, Suzy Mcgrath MD    potassium chloride 20 mEq/50 mL IVPB [facility]), 20 mEq, IntraVENous, PRN **OR** potassium chloride 10 mEq/100 mL IVPB (Peripheral Line), 10 mEq, IntraVENous, PRN, Kathy Chino MD, Last Rate: 100 mL/hr at 08/14/22 0113, 10 mEq at 08/14/22 0113    furosemide (LASIX) injection 40 mg, 40 mg, IntraVENous, BID, Glynn Travis MD, 40 mg at 08/14/22 0859    Physical Exam:  /83   Pulse 85   Temp 97.8 °F (36.6 °C)   Resp 16   Ht 5' 9\" (1.753 m)   Wt 235 lb (106.6 kg)   SpO2 94%   BMI 34.70 kg/m²   Wt Readings from Last 3 Encounters:   08/14/22 235 lb (106.6 kg)   08/05/22 240 lb (108.9 kg)   06/20/22 248 lb 12.8 oz (112.9 kg)     Appearance: Awake, alert, no acute respiratory distress  Skin: Intact, no rash  Head: Normocephalic, atraumatic  Eyes: EOMI, no conjunctival erythema  ENMT: No pharyngeal erythema, MMM, no rhinorrhea  Neck: Supple, JVP elevated at 13 cm, no carotid bruits  Lungs: Clear to auscultation bilaterally. No wheezes, rales, or rhonchi. Cardiac: PMI nondisplaced, irregularly irregular rhythm, S1 & S2 normal, no murmurs  Abdomen: Soft, nontender, +bowel sounds  Extremities: Moves all extremities x 4, 1-2+ lower extremity edema  Neurologic: No focal motor deficits apparent, normal mood and affect  Peripheral Pulses: Intact posterior tibial pulses bilaterally    Intake/Output:    Intake/Output Summary (Last 24 hours) at 8/14/2022 1318  Last data filed at 8/14/2022 1306  Gross per 24 hour   Intake 1227.47 ml   Output 4100 ml   Net -2872.53 ml     I/O this shift:   In: 947.9 [P.O.:420; IV Piggyback:527.9]  Out: 1250 [Urine:1250]    Laboratory Tests:  Recent Labs     08/12/22  1914 08/13/22  0033 08/13/22  0532 08/13/22  0942 08/13/22  1221 08/13/22  1849 08/14/22  0450     --  136  --   --   --  139   K 2.7*   < > 2.9*   < > 3.2* 3.2* 2.9*   CL 94*  --  96*  --   --   --  97*   CO2 31*  --  30*  --   --   --  32*   BUN 33*  --  28*  --   --   --  27*   CREATININE 1.9*  --  1.5*  --   --   --  1.6*   GLUCOSE 93  --  93  -- --   --  93   CALCIUM 9.5  --  8.9  --   --   --  8.9    < > = values in this interval not displayed. Lab Results   Component Value Date/Time    MG 2.0 08/14/2022 04:50 AM     Recent Labs     08/12/22 1914   ALKPHOS 116   ALT 8   AST 15   PROT 7.1   BILITOT 2.4*   LABALBU 3.8     Recent Labs     08/12/22  1914 08/12/22  2216 08/13/22  0532 08/13/22  1221 08/13/22  1849 08/14/22  0450   WBC 10.3  --  8.5  --   --  9.6   RBC 3.51*  --  3.17*  --   --  3.33*   HGB 9.1*   < > 8.2* 9.2* 8.7* 8.6*   HCT 29.8*   < > 27.0* 30.4* 28.9* 28.5*   MCV 84.9  --  85.2  --   --  85.6   MCH 25.9*  --  25.9*  --   --  25.8*   MCHC 30.5*  --  30.4*  --   --  30.2*   RDW 16.5*  --  16.5*  --   --  16.7*     --  191  --   --  218   MPV 9.3  --  9.8  --   --  9.6    < > = values in this interval not displayed.      Lab Results   Component Value Date    CKTOTAL 128 11/18/2014    CKMB 1.7 04/25/2011    TROPONINI <0.01 03/19/2015    TROPONINI 0.02 11/24/2014    TROPONINI 0.09 04/25/2011     Lab Results   Component Value Date    INR 1.2 08/12/2022    INR 1.3 11/05/2014    INR 1.1 11/04/2014    PROTIME 14.1 (H) 08/12/2022    PROTIME 14.0 (H) 11/05/2014    PROTIME 10.9 11/04/2014     Lab Results   Component Value Date    TSH 3.170 08/05/2022     Lab Results   Component Value Date    LABA1C 5.7 11/04/2014     No results found for: EAG  Lab Results   Component Value Date    CHOL 128 10/29/2014    CHOL 143 07/30/2014    CHOL 144 04/25/2011     Lab Results   Component Value Date    TRIG 131 10/29/2014    TRIG 119 07/30/2014    TRIG 134 04/25/2011     Lab Results   Component Value Date    HDL 35 10/29/2014    HDL 38 07/30/2014    HDL 35.0 (A) 04/25/2011     Lab Results   Component Value Date    LDLCALC 67 10/29/2014    LDLCALC 81 07/30/2014    LDLCALC 82 04/25/2011     Lab Results   Component Value Date    LABVLDL 26 10/29/2014    LABVLDL 24 07/30/2014     No results found for: CHOLHDLRATIO  Recent Labs     08/14/22  0450   PROBNP 2,811* Cardiac Tests:    Prior cardiac workup:  ECHO: 7/19/2022,   Left ventricular internal dimensions were normal in diastole and systole. EF 50%. Moderate left ventricular concentric hypertrophy noted. Abnormal (paradoxical) motion consistent with conduction abnormality. Low normal left ventricular systolic function. The left atrium is severely dilated. Moderately enlarged right atrium size. Normal mitral valve leaflet thickness with apical tethering. Moderate centrally directed mitral regurgitation. Physiologic and/or trace aortic regurgitation is noted. Mild tricuspid regurgitation. Pulmonary hypertension is moderate . There is a trivial circumferential pericardial effusion noted. 5/11/15 Summary   Compared to prior echo, no changes noted. Technically adequate study. Mild asymmetric left ventricular hypertrophy   Ejection fraction is visually estimated at 50%. Mild global wall hypokinesis. There is doppler evidence of stage III diastolic dysfunction. Right ventricle global systolic function is mildly reduced . Mild tricuspid regurgitation. RVSP was not measured. Stress Test:     Angiography: 10/31/14 IMPRESSION:  Severe triple-vessel disease. MPI from 8/12/22:  Impression:    Electrocardiographically normal regadenoson infusion with a clinically nonischemic response. Myocardial perfusion imaging was abnormal.    The abnormality was a a small sized fixed defect in the mid inferior wall suggestive of a prior MI  Overall left ventricular systolic function mildly reduced at 45-50%, with mild global hypokinesis, abnormal septal motion. 4.   Low to Intermediate risk general pharmacologic stress test     IMPRESSION:  1. Acute decompensated diastolic heart failure: He appears hypervolemic on examination. Doing well with IV Lasix. 2 L negative since yesterday. Monitor daily weights, renal function and urine output. BNP from 8/5/2022 elevated at 7118.   BNP from today at 2811.  Blood pressure management as below  2. Atrial fibrillation: Persistent, recently diagnosed, controlled ventricular response, [id number] score of 3, on chronic anticoagulation with Eliquis 5 mg by mouth twice daily. Currently, the Eliquis is being held on account of his hematoma. Once he is able to be on full dose anticoagulation, we will plan on a THANH guided cardioversion as he will certainly benefit from a rhythm control strategy. He does have a history of sleep apnea but has not been using his CPAP in the last year. He will need a follow-up in the sleep clinic and will need compliance with CPAP. Not on any rate control therapy at this time. 3. Hypertension: On Norvasc 10 mg daily. We will continue that here. Pressures are stable currently. 4.  Coronary artery disease status post CABG:left internal mammary artery to the LAD, saphenous vein graft to the diagonal branch of the LAD, saphenous vein graft to the posterolateral with a Y graft to the PDA, and the left radial artery to the obtuse marginal, s/p remote PCI to RCA and LAD. Underwent a nuclear perfusion scan in the office on Friday. Shows infarction without ischemia. We will continue with medical management for now. He is currently not on statin therapy. We will check lipid panel in the morning. We will also start low-dose aspirin when able. 5.  Mitral valve regurgitation: Moderate. Volume status management as above. We will continue to monitor. 6.  Tricuspid valve regurgitation: Mild  7. Chronic anticoagulation  8. Stage III chronic kidney disease. We will monitor renal function closely    Marybeth Do MD, [address] [facility]    NOTE: This report was transcribed using voice recognition software. Every effort was made to ensure accuracy; however, inadvertent computerized transcription errors may be present.

## 2022-08-14 NOTE — PROGRESS NOTES
[address] [place], [place] [address]ist   Progress Note    Admitting Date and Time: 8/12/2022  6:37 PM  Admit Dx: Hypokalemia [E87.6]  ABLA (acute blood loss anemia) [D62]    Subjective:    8/13: Pt feels better. States he became concerned when bruising from right side kept expanding and then when became alarmed when it went into groin. 8/14: Pt states he lost 5# of fluid. He is feeling better he is asking if he is being discharged today. Per RN: K low again this morning 2.9.        potassium chloride  40 mEq Oral Q4H    amLODIPine  10 mg Oral Daily    sodium chloride flush  10 mL IntraVENous 2 times per day    furosemide  40 mg IntraVENous BID     sodium chloride flush, 10 mL, PRN  sodium chloride, , PRN  polyethylene glycol, 17 g, Daily PRN  acetaminophen, 650 mg, Q6H PRN   Or  acetaminophen, 650 mg, Q6H PRN  potassium chloride, 20 mEq, PRN   Or  potassium chloride, 10 mEq, PRN       Objective:    /83   Pulse 85   Temp 97.8 °F (36.6 °C)   Resp 16   Ht 5' 9\" (1.753 m)   Wt 235 lb (106.6 kg)   SpO2 94%   BMI 34.70 kg/m²   (Seen and examined with nurse chaperone today)  General Appearance: alert and oriented to person, place and time and in no acute distress  Skin: Extensive ecchymosis starting from the right proximal mid axillary line extending down to his flank into the groin and abdomen.   See enclosed pictures from ER visit  Head: normocephalic and atraumatic  Eyes: pupils equal, round, and reactive to light, extraocular eye movements intact, conjunctivae normal  Neck: neck supple and non tender without mass   Pulmonary/Chest: clear to auscultation bilaterally- no wheezes, rales or rhonchi, normal air movement, no respiratory distress  Cardiovascular: normal rate, normal S1 and S2 and no carotid bruits  Abdomen: soft, non-tender, non-distended, normal bowel sounds, no masses or organomegaly  Musculoskeletal: Large palpable hematoma in the proximal right mid axillary line but the bulk of the hematoma is posterior to the mid axillary line (this was outlined today with surgical marker today). Ecchymosis extends into suprapubic area as well as right side of scrotum  Extremities: chronic venous stasis changes with some edema noted  Neurologic: no cranial nerve deficit and speech normal      Recent Labs     08/12/22 1914 08/13/22  0033 08/13/22  0532 08/13/22  0942 08/13/22  1221 08/13/22  1849 08/14/22  0450     --  136  --   --   --  139   K 2.7*   < > 2.9*   < > 3.2* 3.2* 2.9*   CL 94*  --  96*  --   --   --  97*   CO2 31*  --  30*  --   --   --  32*   BUN 33*  --  28*  --   --   --  27*   CREATININE 1.9*  --  1.5*  --   --   --  1.6*   GLUCOSE 93  --  93  --   --   --  93   CALCIUM 9.5  --  8.9  --   --   --  8.9    < > = values in this interval not displayed. Recent Labs     08/12/22 1914   ALKPHOS 116   PROT 7.1   LABALBU 3.8   BILITOT 2.4*   AST 15   ALT 8         Recent Labs     08/12/22 1914 08/12/22  2216 08/13/22  0532 08/13/22  1221 08/13/22  1849 08/14/22  0450   WBC 10.3  --  8.5  --   --  9.6   RBC 3.51*  --  3.17*  --   --  3.33*   HGB 9.1*   < > 8.2* 9.2* 8.7* 8.6*   HCT 29.8*   < > 27.0* 30.4* 28.9* 28.5*   MCV 84.9  --  85.2  --   --  85.6   MCH 25.9*  --  25.9*  --   --  25.8*   MCHC 30.5*  --  30.4*  --   --  30.2*   RDW 16.5*  --  16.5*  --   --  16.7*     --  191  --   --  218   MPV 9.3  --  9.8  --   --  9.6    < > = values in this interval not displayed. Latest Reference Range & Units 8/12/22 19:14 8/12/22 21:00 8/13/22 00:33   Troponin, High Sensitivity 0 - 11 ng/L 40 (H) 39 (H) 41 (H)   (H): Data is abnormally high    Radiology:   CT CHEST W CONTRAST   Final Result   1. Heterogeneous mass associated with posterolateral right chest wall   musculature related to large intramuscular hematoma with overlying soft   tissue edema. 2. No evidence of underlying rib fracture. 3. Moderate right pleural effusion. Small left pleural effusion.    4. Atelectatic function  -Lexiscan nuclear stress test 8/12 and showed small size fixed defect in the mid inferior wall suggestive of prior MI.  EF was 45 to 50% with mild global hypokinesis with abnormal septal motion. Overall, low to intermediate risk test.    3.  Hypokalemia  -Patient received IV potassium 10 mEq x 3 in the ER and then overnight received 5 but IV infiltrated on the sixth bag. He also received 40 mEq in the ER and yesterday repeat 40 mEq x2. Magnesium within normal limits at 2.1.   -Continue to follow K. Initial K 2.7 and improved to 3.2 yesterday afternoon. Today, K down to 2.9 so will replete 40 mEq bid and will place on that dose going forward. He was taking 10 mEq bid at home PTA. 4.  CKD stage 3B  -monitor creatinine with diuresis. Trend as follows: 1.9-->1.5-->1.6    5. Disposition  -will need additional diuresis next 24-48 hours per cardiology and then may need THANH DCCV. Case discussed with Dr. Adrianna Gonzalez of [facility] on the floor. NOTE: This report was transcribed using voice recognition software. Every effort was made to ensure accuracy; however, inadvertent computerized transcription errors may be present.      Electronically signed by Pam Sargent MD on 8/14/2022 at 10:05 AM

## 2022-08-15 LAB
ANION GAP SERPL CALCULATED.3IONS-SCNC: 10 MMOL/L (ref 7–16)
ANION GAP SERPL CALCULATED.3IONS-SCNC: 10 MMOL/L (ref 7–16)
BUN BLDV-MCNC: 29 MG/DL (ref 6–20)
BUN BLDV-MCNC: 29 MG/DL (ref 6–20)
CALCIUM SERPL-MCNC: 8.8 MG/DL (ref 8.6–10.2)
CALCIUM SERPL-MCNC: 9 MG/DL (ref 8.6–10.2)
CHLORIDE BLD-SCNC: 94 MMOL/L (ref 98–107)
CHLORIDE BLD-SCNC: 95 MMOL/L (ref 98–107)
CHOLESTEROL, FASTING: 138 MG/DL (ref 0–199)
CO2: 31 MMOL/L (ref 22–29)
CO2: 33 MMOL/L (ref 22–29)
CREAT SERPL-MCNC: 1.5 MG/DL (ref 0.7–1.2)
CREAT SERPL-MCNC: 1.5 MG/DL (ref 0.7–1.2)
GFR AFRICAN AMERICAN: 58
GFR AFRICAN AMERICAN: 58
GFR NON-AFRICAN AMERICAN: 48 ML/MIN/1.73
GFR NON-AFRICAN AMERICAN: 48 ML/MIN/1.73
GLUCOSE BLD-MCNC: 95 MG/DL (ref 74–99)
GLUCOSE BLD-MCNC: 98 MG/DL (ref 74–99)
HCT VFR BLD CALC: 30 % (ref 37–54)
HDLC SERPL-MCNC: 44 MG/DL
HEMOGLOBIN: 9.1 G/DL (ref 12.5–16.5)
LDL CHOLESTEROL CALCULATED: 67 MG/DL (ref 0–99)
MAGNESIUM: 2 MG/DL (ref 1.6–2.6)
MCH RBC QN AUTO: 26.2 PG (ref 26–35)
MCHC RBC AUTO-ENTMCNC: 30.3 % (ref 32–34.5)
MCV RBC AUTO: 86.5 FL (ref 80–99.9)
PDW BLD-RTO: 17.5 FL (ref 11.5–15)
PLATELET # BLD: 223 E9/L (ref 130–450)
PMV BLD AUTO: 9.4 FL (ref 7–12)
POTASSIUM SERPL-SCNC: 2.7 MMOL/L (ref 3.5–5)
POTASSIUM SERPL-SCNC: 3.4 MMOL/L (ref 3.5–5)
POTASSIUM SERPL-SCNC: 3.4 MMOL/L (ref 3.5–5)
RBC # BLD: 3.47 E12/L (ref 3.8–5.8)
SODIUM BLD-SCNC: 136 MMOL/L (ref 132–146)
SODIUM BLD-SCNC: 137 MMOL/L (ref 132–146)
TRIGLYCERIDE, FASTING: 133 MG/DL (ref 0–149)
VLDLC SERPL CALC-MCNC: 27 MG/DL
WBC # BLD: 8.2 E9/L (ref 4.5–11.5)

## 2022-08-15 PROCEDURE — 6370000000 HC RX 637 (ALT 250 FOR IP): Performed by: INTERNAL MEDICINE

## 2022-08-15 PROCEDURE — 80061 LIPID PANEL: CPT

## 2022-08-15 PROCEDURE — 6360000002 HC RX W HCPCS: Performed by: INTERNAL MEDICINE

## 2022-08-15 PROCEDURE — 36415 COLL VENOUS BLD VENIPUNCTURE: CPT

## 2022-08-15 PROCEDURE — 83735 ASSAY OF MAGNESIUM: CPT

## 2022-08-15 PROCEDURE — 84132 ASSAY OF SERUM POTASSIUM: CPT

## 2022-08-15 PROCEDURE — 85027 COMPLETE CBC AUTOMATED: CPT

## 2022-08-15 PROCEDURE — 80048 BASIC METABOLIC PNL TOTAL CA: CPT

## 2022-08-15 PROCEDURE — 2709999900 HC NON-CHARGEABLE SUPPLY

## 2022-08-15 PROCEDURE — 99233 SBSQ HOSP IP/OBS HIGH 50: CPT | Performed by: INTERNAL MEDICINE

## 2022-08-15 PROCEDURE — 2580000003 HC RX 258: Performed by: INTERNAL MEDICINE

## 2022-08-15 PROCEDURE — 99232 SBSQ HOSP IP/OBS MODERATE 35: CPT | Performed by: INTERNAL MEDICINE

## 2022-08-15 PROCEDURE — 76937 US GUIDE VASCULAR ACCESS: CPT

## 2022-08-15 PROCEDURE — 2060000000 HC ICU INTERMEDIATE R&B

## 2022-08-15 RX ORDER — POTASSIUM CHLORIDE 29.8 MG/ML
20 INJECTION INTRAVENOUS ONCE
Status: DISCONTINUED | OUTPATIENT
Start: 2022-08-15 | End: 2022-08-16

## 2022-08-15 RX ORDER — POTASSIUM CHLORIDE 20 MEQ/1
40 TABLET, EXTENDED RELEASE ORAL ONCE
Status: COMPLETED | OUTPATIENT
Start: 2022-08-15 | End: 2022-08-15

## 2022-08-15 RX ORDER — HEPARIN SODIUM (PORCINE) LOCK FLUSH IV SOLN 100 UNIT/ML 100 UNIT/ML
1 SOLUTION INTRAVENOUS PRN
Status: DISCONTINUED | OUTPATIENT
Start: 2022-08-15 | End: 2022-08-16 | Stop reason: HOSPADM

## 2022-08-15 RX ORDER — SODIUM CHLORIDE 9 MG/ML
INJECTION, SOLUTION INTRAVENOUS PRN
Status: DISCONTINUED | OUTPATIENT
Start: 2022-08-15 | End: 2022-08-16 | Stop reason: HOSPADM

## 2022-08-15 RX ORDER — HEPARIN SODIUM (PORCINE) LOCK FLUSH IV SOLN 100 UNIT/ML 100 UNIT/ML
1 SOLUTION INTRAVENOUS EVERY 12 HOURS SCHEDULED
Status: DISCONTINUED | OUTPATIENT
Start: 2022-08-15 | End: 2022-08-16 | Stop reason: HOSPADM

## 2022-08-15 RX ORDER — POTASSIUM CHLORIDE 7.45 MG/ML
10 INJECTION INTRAVENOUS ONCE
Status: COMPLETED | OUTPATIENT
Start: 2022-08-15 | End: 2022-08-15

## 2022-08-15 RX ORDER — POTASSIUM CHLORIDE 20 MEQ/1
40 TABLET, EXTENDED RELEASE ORAL
Status: DISCONTINUED | OUTPATIENT
Start: 2022-08-15 | End: 2022-08-16 | Stop reason: HOSPADM

## 2022-08-15 RX ORDER — POTASSIUM CHLORIDE 29.8 MG/ML
40 INJECTION INTRAVENOUS ONCE
Status: COMPLETED | OUTPATIENT
Start: 2022-08-15 | End: 2022-08-15

## 2022-08-15 RX ORDER — SODIUM CHLORIDE 0.9 % (FLUSH) 0.9 %
5-40 SYRINGE (ML) INJECTION EVERY 12 HOURS SCHEDULED
Status: DISCONTINUED | OUTPATIENT
Start: 2022-08-15 | End: 2022-08-16 | Stop reason: HOSPADM

## 2022-08-15 RX ORDER — SODIUM CHLORIDE 0.9 % (FLUSH) 0.9 %
5-40 SYRINGE (ML) INJECTION PRN
Status: DISCONTINUED | OUTPATIENT
Start: 2022-08-15 | End: 2022-08-16 | Stop reason: HOSPADM

## 2022-08-15 RX ADMIN — FUROSEMIDE 40 MG: 10 INJECTION, SOLUTION INTRAMUSCULAR; INTRAVENOUS at 17:38

## 2022-08-15 RX ADMIN — POTASSIUM CHLORIDE 40 MEQ: 1500 TABLET, EXTENDED RELEASE ORAL at 16:28

## 2022-08-15 RX ADMIN — POTASSIUM CHLORIDE 10 MEQ: 7.46 INJECTION, SOLUTION INTRAVENOUS at 06:56

## 2022-08-15 RX ADMIN — AMLODIPINE BESYLATE 10 MG: 5 TABLET ORAL at 09:18

## 2022-08-15 RX ADMIN — SODIUM CHLORIDE, PRESERVATIVE FREE 10 ML: 5 INJECTION INTRAVENOUS at 21:02

## 2022-08-15 RX ADMIN — POTASSIUM CHLORIDE 40 MEQ: 1500 TABLET, EXTENDED RELEASE ORAL at 09:18

## 2022-08-15 RX ADMIN — POTASSIUM CHLORIDE 40 MEQ: 29.8 INJECTION, SOLUTION INTRAVENOUS at 09:16

## 2022-08-15 RX ADMIN — POTASSIUM CHLORIDE 20 MEQ: 400 INJECTION, SOLUTION INTRAVENOUS at 13:53

## 2022-08-15 RX ADMIN — POTASSIUM CHLORIDE 40 MEQ: 1500 TABLET, EXTENDED RELEASE ORAL at 06:56

## 2022-08-15 RX ADMIN — SODIUM CHLORIDE, PRESERVATIVE FREE 10 ML: 5 INJECTION INTRAVENOUS at 09:19

## 2022-08-15 RX ADMIN — FUROSEMIDE 40 MG: 10 INJECTION, SOLUTION INTRAMUSCULAR; INTRAVENOUS at 09:18

## 2022-08-15 ASSESSMENT — PAIN SCALES - GENERAL: PAINLEVEL_OUTOF10: 0

## 2022-08-15 NOTE — CARE COORDINATION
8-15-Cm note : ( no covid testing) met with pt for transition of care needs, pt lives alone , he is independent, no DME, pt walks 3-5 days a week and plays golf, pt plans on going home with no needs, his dtr will provide transport home .  Electronically signed by Roni Potts RN on 8/15/2022 at 3:40 PM

## 2022-08-15 NOTE — PROGRESS NOTES
[facility]      Patient Name: Jacob Lizarraga  YOB: 1965  PCP: No primary care provider on file. Referring Provider:      Reason for Consultation:   Chief Complaint   Patient presents with    Bleeding/Bruising     Had a stress test this am, bruising noted to right shoulder blade down back to hips with weight gain, short of breath, no chest pain      Subjective: Feeling better today but remains concerned about his hematomas. No overt bleeding    History of Present Illness: This pt is a 60 yo male who presented to the ED after seeing his cardiologist for R chest wall hematoma. He was started on Eliquis ~1 month prior for A fib/flutter, ~1 week ago, played golf, and then the next day noted a R chest hematoma below scapula, which over the next few days expanded downward. He has been off Eliquis since 8/8. He was given fluid in ER and initial Hgb of 9.1 dropped to 8.0, but has since been stable (normal baseline is ~13-14).  He has had no other overt bleeding    Diagnostic Data:     Past Medical History:   Diagnosis Date    Atrial fibrillation ([address] 11/13/2014    post ACB    CAD (coronary artery disease)     Carotid artery stenosis     Chronic kidney disease     Diastolic CHF ([address] 76/23/2418    Echo 10/17/14 EF 68% stage I diastolic, 88/2/27  EF 87% with stage II diastolic chf    Hyperlipidemia     Hypertension     Intramuscular hematoma 8/13/2022    Obesity        Patient Active Problem List    Diagnosis Date Noted    Acute decompensated heart failure ([address] 03/19/2015    Hypokalemia 08/13/2022    Intramuscular hematoma 08/13/2022    Persistent atrial fibrillation ([address] 08/13/2022    Ischemic cardiomyopathy 08/13/2022    Acute on chronic heart failure with preserved ejection fraction ([address] 08/13/2022    Stage 3b chronic kidney disease ([address]     ABLA (acute blood loss anemia) 08/12/2022    Renal insufficiency 03/22/2015    CAD (coronary artery LABGLOM 48 08/15/2022    GFRAA 58 08/15/2022       No results found for: IRON, TIBC, FERRITIN        Radiology-    CT CHEST W CONTRAST   Final Result   1. Heterogeneous mass associated with posterolateral right chest wall   musculature related to large intramuscular hematoma with overlying soft   tissue edema. 2. No evidence of underlying rib fracture. 3. Moderate right pleural effusion. Small left pleural effusion. 4. Atelectatic changes in right lung base   5. Pulmonary vascular congestion. 6. View of the upper abdomen shows ascites. CT ABDOMEN PELVIS W IV CONTRAST Additional Contrast? None   Final Result   1. An early cirrhotic change of the liver   2. Moderate volume of ascites within the abdomen and pelvis   3. Hematoma of the right serratus anterior muscle. 4. Sigmoid diverticulosis but no signs of diverticulitis. 5. There is marked stranding of the subcutaneous tissues along the abdominal   wall concerning for a low protein state. XR CHEST PORTABLE   Final Result   Cardiomegaly with mild pulmonary vascular congestion. No obvious airspace   opacity or pleural effusion. ASSESSMENT/PLAN :  58 yo male  R chest hematoma/ecchymosis  Hx of acute on chronic DHF    - On Eliquis for A fib/flutter. Started 1 month ago. No significant trauma, but developed a hematoma which has expanded downward. Has been on Eliquis since 8/8/22  - Coag from 8/12 with mildly increased PT of 14.1, INR 1.2, PTT of 29  - Hgb has been stable since yesterday. Likely no ongoing bleeding at this time point, but would continue to monitor Hgb  - I doubt a primary bleeding diathesis, as he has never had a clinically significant bleeding episode in the past (including multiple surgical procedures). Normal platelet count. BUN is slightly elevated form CKD, but not enough to cause uremic platelet dysfunction.  Coagulation profile is not significantly impaired as above  - The mildly elevated bili is likely mostly indirect, and not related to intravascular hemolysis but break down of the hematoma  - Will check PFA, mixing study and vW profile  - Most likely scenario is a muscle tear with subsequent bleeding and hematoma expansion downward while on an agent which is known to cause increased risk for bleeding  - Will defer to cardiology, but if patient must resume Eliquis, may be better to have him on 2.5 mg BID rather than 5 mg BID    Thank you for this consult. Please call with further questions or concerns    8/15/2022  Feeling better today but remains concerned about his hematomas. No overt bleeding  Hematoma related to anticoagulation with Eliquis  BUN minimally elevated and would not affect platelet function at such level  CBC shows improved hemoglobin to 9.1 with a normal platelet count and normal WBC count  Eliquis remains on hold  Platelet function studies nondiagnostic.     Electronically signed by Daysi Lyons MD on 8/15/2022 at 3:23 PM

## 2022-08-15 NOTE — PROGRESS NOTES
[address] [place], [place] [address]ist   Progress Note    Admitting Date and Time: 8/12/2022  6:37 PM  Admit Dx: Hypokalemia [E87.6]  ABLA (acute blood loss anemia) [D62]    Subjective/interval history:    8/14: Pt feels better. States he became concerned when bruising from right side kept expanding and then when became alarmed when it went into groin. 8/15: Patient continues to feel better, notes the bruising has been fairly stable over the last day but hematomas are noticeably evolving.       lidocaine  5 mL IntraDERmal Once    sodium chloride flush  5-40 mL IntraVENous 2 times per day    heparin flush  1 mL IntraVENous 2 times per day    potassium chloride  40 mEq IntraVENous Once    Followed by    potassium chloride  20 mEq IntraVENous Once    potassium chloride  40 mEq Oral TID WC    amLODIPine  10 mg Oral Daily    furosemide  40 mg IntraVENous BID     sodium chloride flush, 5-40 mL, PRN  sodium chloride, , PRN  heparin flush, 1 mL, PRN  polyethylene glycol, 17 g, Daily PRN  acetaminophen, 650 mg, Q6H PRN   Or  acetaminophen, 650 mg, Q6H PRN  potassium chloride, 20 mEq, PRN   Or  potassium chloride, 10 mEq, PRN       Objective:    /82   Pulse 88   Temp 97.8 °F (36.6 °C)   Resp 16   Ht 5' 9\" (1.753 m)   Wt 228 lb 6.4 oz (103.6 kg)   SpO2 94%   BMI 33.73 kg/m²   General Appearance: alert and oriented to person, place and time and in no acute distress  Skin: Extensive ecchymosis starting from the right proximal mid axillary line extending down to his flank into the groin and abdomen. Pen syd around hematoma shows stable volume. See enclosed pictures from ER visit  Head: normocephalic and atraumatic  Eyes: pupils equal, round, and reactive to light, extraocular eye movements intact, conjunctivae normal  Neck: neck supple and non tender without mass   Pulmonary/Chest: Nonlabored on room air.   Diminished but otherwise clear to auscultation bilaterally  Cardiovascular: normal rate, normal S1 and abdomen shows ascites. CT ABDOMEN PELVIS W IV CONTRAST Additional Contrast? None   Final Result   1. An early cirrhotic change of the liver   2. Moderate volume of ascites within the abdomen and pelvis   3. Hematoma of the right serratus anterior muscle. 4. Sigmoid diverticulosis but no signs of diverticulitis. 5. There is marked stranding of the subcutaneous tissues along the abdominal   wall concerning for a low protein state. XR CHEST PORTABLE   Final Result   Cardiomegaly with mild pulmonary vascular congestion. No obvious airspace   opacity or pleural effusion. Assessment and Plan:  Principal Problem:    ABLA (acute blood loss anemia)  Active Problems:    Acute decompensated heart failure (HCC)    Stage 3b chronic kidney disease (HCC)    Hypokalemia    Intramuscular hematoma    Persistent atrial fibrillation (HCC)    Ischemic cardiomyopathy    Acute on chronic heart failure with preserved ejection fraction ([address]  Resolved Problems:    * No resolved hospital problems. *        Acute blood loss anemia due to intramuscular hematoma in the setting of right serratus anterior muscle tear on chronic anticoagulation and probable underlying liver disease  -Continue to follow hemoglobin and transfuse as needed.  -Hold anticoagulation for now.  -hem/onc consulted to evaluate for any underlying blood diathesis might have contributed to significant bleeding on Eliquis. Work-up ordered. 2. Acute on chronic diastolic heart failure  -Cardiology consulted and continuing IV Lasix through today then probably transition to oral tomorrow  -Echo from July 19, 2022 revealed EF 50% with indeterminate diastolic function  -Lexiscan nuclear stress test 8/12 and showed small size fixed defect in the mid inferior wall suggestive of prior MI.  EF was 45 to 50% with mild global hypokinesis with abnormal septal motion.   Overall, low to intermediate risk test.    3.  Hypokalemia and

## 2022-08-15 NOTE — PLAN OF CARE
Problem: Pain  Goal: Verbalizes/displays adequate comfort level or baseline comfort level  8/15/2022 1026 by Mervat Sanders RN  Outcome: Completed  8/15/2022 0557 by Sandra Garcia RN  Outcome: Progressing

## 2022-08-15 NOTE — PLAN OF CARE
Problem: Discharge Planning  Goal: Discharge to home or other facility with appropriate resources  Outcome: Progressing  Flowsheets (Taken 8/14/2022 1945)  Discharge to home or other facility with appropriate resources: Identify barriers to discharge with patient and caregiver     Problem: Pain  Goal: Verbalizes/displays adequate comfort level or baseline comfort level  Outcome: Progressing     Problem: Safety - Adult  Goal: Free from fall injury  Outcome: Progressing  Flowsheets (Taken 8/14/2022 2000)  Free From Fall Injury: Instruct family/caregiver on patient safety     Problem: ABCDS Injury Assessment  Goal: Absence of physical injury  Outcome: Progressing  Flowsheets (Taken 8/14/2022 2000)  Absence of Physical Injury: Implement safety measures based on patient assessment

## 2022-08-15 NOTE — PROGRESS NOTES
INPATIENT CARDIOLOGY FOLLOW-UP    Name: Madelyn Doty    Age: 59 y.o. Date of Admission: 8/12/2022  6:37 PM    Date of Service: 8/15/2022    Primary Cardiologist: Dr Anu Montesinos    Chief Complaint: Follow-up for CHF, AF    Interim History:  Feels well. Denies chest pain or shortness of breath. Feels occasional palpitations. Still with lower extremity edema.   Potassium remains very low getting IV replacement    Review of Systems:   Negative except as described above    Problem List:  Patient Active Problem List   Diagnosis    CAD (coronary artery disease)    Acute decompensated heart failure (HCC)    Renal insufficiency    ABLA (acute blood loss anemia)    Stage 3b chronic kidney disease (HCC)    Hypokalemia    Intramuscular hematoma    Persistent atrial fibrillation (HCC)    Ischemic cardiomyopathy    Acute on chronic heart failure with preserved ejection fraction (HCC)       Current Medications:    Current Facility-Administered Medications:     potassium chloride (KLOR-CON M) extended release tablet 40 mEq, 40 mEq, Oral, BID WC, Maricel Vela MD    amLODIPine (NORVASC) tablet 10 mg, 10 mg, Oral, Daily, Suzy Mcgrath MD, 10 mg at 08/14/22 0859    sodium chloride flush 0.9 % injection 10 mL, 10 mL, IntraVENous, 2 times per day, Dov Trivedi MD, 10 mL at 08/14/22 1955    sodium chloride flush 0.9 % injection 10 mL, 10 mL, IntraVENous, PRN, Suzy Mcgrath MD    0.9 % sodium chloride infusion, , IntraVENous, PRN, Suzy Mcgrath MD    polyethylene glycol (GLYCOLAX) packet 17 g, 17 g, Oral, Daily PRN, Suzy Mcgrath MD    acetaminophen (TYLENOL) tablet 650 mg, 650 mg, Oral, Q6H PRN **OR** acetaminophen (TYLENOL) suppository 650 mg, 650 mg, Rectal, Q6H PRN, Suzy Mcgrath MD    potassium chloride 20 mEq/50 mL IVPB (Central Line), 20 mEq, IntraVENous, PRN **OR** potassium chloride 10 mEq/100 mL IVPB (Peripheral Line), 10 mEq, IntraVENous, PRN, Dov Trivedi MD, Last Rate: 100 mL/hr at 08/14/22 0113, 10 mEq at 08/14/22 0113    furosemide (LASIX) injection 40 mg, 40 mg, IntraVENous, BID, Cholo Larson MD, 40 mg at 08/14/22 1748    Physical Exam:  /82   Pulse 63   Temp 97.5 °F (36.4 °C) (Infrared)   Resp 18   Ht 5' 9\" (1.753 m)   Wt 228 lb 6.4 oz (103.6 kg)   SpO2 94%   BMI 33.73 kg/m²   Wt Readings from Last 3 Encounters:   08/15/22 228 lb 6.4 oz (103.6 kg)   08/05/22 240 lb (108.9 kg)   06/20/22 248 lb 12.8 oz (112.9 kg)     Appearance: Overweight, awake, alert, no acute respiratory distress  Skin: Intact, no rash  Head: Normocephalic, atraumatic  Eyes: EOMI, no conjunctival erythema  ENMT: No pharyngeal erythema, MMM, no rhinorrhea  Neck: Supple, no elevated JVP, no carotid bruits  Lungs: Clear to auscultation bilaterally. No wheezes, rales, or rhonchi. Cardiac: PMI nondisplaced, irregular rhythm with a normal rate, S1 & S2 normal, no murmurs  Extensive ecchymoses of the right chest flank and back with prominent indurated swelling beneath the right scapula  Abdomen: Soft, nontender, +bowel sounds  Extremities: Moves all extremities x 4, 1-2+ pitting bilateral lower extremity edema  Neurologic: No focal motor deficits apparent, normal mood and affect  Peripheral Pulses: Intact posterior tibial pulses bilaterally    Intake/Output:    Intake/Output Summary (Last 24 hours) at 8/15/2022 0730  Last data filed at 8/15/2022 0706  Gross per 24 hour   Intake 947.85 ml   Output 4900 ml   Net -3952.15 ml     I/O this shift:  In: -   Out: 300 [Urine:300]    Laboratory Tests:  Recent Labs     08/13/22  0532 08/13/22  0942 08/13/22  1849 08/14/22  0450 08/15/22  0548     --   --  139 137   K 2.9*   < > 3.2* 2.9* 2.7*   CL 96*  --   --  97* 94*   CO2 30*  --   --  32* 33*   BUN 28*  --   --  27* 29*   CREATININE 1.5*  --   --  1.6* 1.5*   GLUCOSE 93  --   --  93 98   CALCIUM 8.9  --   --  8.9 8.8    < > = values in this interval not displayed.      Lab Results   Component Value Date/Time    MG 2.0 08/14/2022 04:50 AM     Recent Labs     22  1914   ALKPHOS 116   ALT 8   AST 15   PROT 7.1   BILITOT 2.4*   LABALBU 3.8     Recent Labs     22  0532 22  1221 22  1849 22  0450 08/15/22  0548   WBC 8.5  --   --  9.6 8.2   RBC 3.17*  --   --  3.33* 3.47*   HGB 8.2*   < > 8.7* 8.6* 9.1*   HCT 27.0*   < > 28.9* 28.5* 30.0*   MCV 85.2  --   --  85.6 86.5   MCH 25.9*  --   --  25.8* 26.2   MCHC 30.4*  --   --  30.2* 30.3*   RDW 16.5*  --   --  16.7* 17.5*     --   --  218 223   MPV 9.8  --   --  9.6 9.4    < > = values in this interval not displayed. Lab Results   Component Value Date    CKTOTAL 128 2014    CKMB 1.7 2011    TROPONINI <0.01 2015    TROPONINI 0.02 2014    TROPONINI 0.09 2011     Lab Results   Component Value Date    INR 1.2 2022    INR 1.3 2014    INR 1.1 2014    PROTIME 14.1 (H) 2022    PROTIME 14.0 (H) 2014    PROTIME 10.9 2014     Lab Results   Component Value Date    TSH 3.170 2022     Lab Results   Component Value Date    LABA1C 5.7 2014     No results found for: EAG  Lab Results   Component Value Date    CHOL 128 10/29/2014    CHOL 143 2014    CHOL 144 2011     Lab Results   Component Value Date    TRIG 131 10/29/2014    TRIG 119 2014    TRIG 134 2011     Lab Results   Component Value Date    HDL 44 08/15/2022    HDL 35 10/29/2014    HDL 38 2014     Lab Results   Component Value Date    LDLCALC 67 08/15/2022    LDLCALC 67 10/29/2014    LDLCALC 81 2014     Lab Results   Component Value Date    LABVLDL 27 08/15/2022    LABVLDL 26 10/29/2014    LABVLDL 24 2014     No results found for: CHOLHDLRATIO  Recent Labs     22  0450   PROBNP 2,811*       Cardiac Tests:    EK2022: Atrial fibrillation with premature or aberrant complexes. Normal axis. IVCD QRS duration 110 ms.   Nonspecific ST changes    2022: Atrial fibrillation  2022: Atrial fibrillation  5/21/2022: Atrial fibrillation  3/12/2021: Sinus rhythm first-degree AV block    Telemetry: Atrial fibrillation 70s to 80s    Chest X-ray:   8/15/22    Impression   Cardiomegaly with mild pulmonary vascular congestion. No obvious airspace   opacity or pleural effusion. CT chest 8/12/22  Impression   1. Heterogeneous mass associated with posterolateral right chest wall   musculature related to large intramuscular hematoma with overlying soft   tissue edema. 2. No evidence of underlying rib fracture. 3. Moderate right pleural effusion. Small left pleural effusion. 4. Atelectatic changes in right lung base   5. Pulmonary vascular congestion. 6. View of the upper abdomen shows ascites. Echocardiogram:   TTE 7/19/22 [place]   Summary   Left ventricular internal dimensions were normal in diastole and systole. Moderate left ventricular concentric hypertrophy noted. Abnormal (paradoxical) motion consistent with conduction abnormality. Low normal left ventricular systolic function. EF 50%   The left atrium is severely dilated. Moderately enlarged right atrium size. Normal mitral valve leaflet thickness with apical tethering. Moderate centrally directed mitral regurgitation. Physiologic and/or trace aortic regurgitation is noted. Mild tricuspid regurgitation. Pulmonary hypertension is moderate . There is a trivial circumferential pericardial effusion noted. Stress test:    Pharm stress 8/12/22  Gated SPECT left ventricular ejection fraction was calculated to be 45-50%, with mild global hypokinesis, abnormal septal motion. Impression:    Electrocardiographically normal regadenoson infusion with a clinically nonischemic response.   Myocardial perfusion imaging was abnormal.    The abnormality was a a small sized fixed defect in the mid inferior wall suggestive of a prior MI  Overall left ventricular systolic function mildly reduced at 45-50%, with mild global hypokinesis, abnormal septal motion. 4.   Low to Intermediate risk general pharmacologic stress test    Cardiac catheterization:  Angiography: 10/31/14 IMPRESSION:  Severe triple-vessel disease.       ----------------------------------------------------------------------------------------------------------------------------------------------------------------  IMPRESSION:  Acute on chronic heart failure with preserved EF.  proBNP 2800. Net -6 L  Ischemic cardiomyopathy EF 50%  Persistent atrial fibrillation. Noted 5/2022 started on apixaban. History of postop AF after CABG  CAD s/p CABG 2014 (LIMA-LAD, V-D, [id number], RA-OM), history of prior PCI to RCA and LAD 2006. Recent stress nonischemic with fixed inferior defect.   Moderate MR, mild TR  Right flank intramuscular hematoma  Acute blood loss anemia, stable 9.1 baseline 14->8  Severe hypokalemia 2.7  CKD creatinine 1.9 -> 1.5 consistent with recent baseline  Hypertension  Hyperlipidemia  ISR[place]    RECOMMENDATIONS:    Continue IV diuresis for another day, likely transition to oral tomorrow  Needs aggressive potassium replacement  AF rates seem well controlled despite not being on any AV dirk blocking agents  No plans for inpatient cardioversion, as he needs to be on stable full anticoagulation before that can be considered  Apixaban on hold, would hold for at least a 1-2 weeks until hematoma resolved and then consider cautious reintroduction  Based on age weight and renal function, the recommended dose for stroke risk reduction is 5 mg twice daily and while the 2.5 mg twice daily would certainly have less risk of bleeding complications he would not be adequately protected from stroke and the reduced dose would not allow for safe electrical cardioversion  Agree etiology is likely muscle tear from the golf swings after being out of practice for a while, and occurred while on apixaban plus aspirin  Would avoid concurrent aspirin therapy if able to be resumed on anticoagulation  Statin therapy recommended given premature CAD  Consider evaluation for hyperaldosteronism or other potassium wasting conditions given easily provoked hypokalemia, though his hypertension does not appear to be that severe  Aggressive risk factor modification  Further care per primary service and consultants  Possible discharge tomorrow with close outpatient follow-up    Beto Guerrier MD, [address] [place] [address] Cardiology    NOTE: This report was transcribed using voice recognition software. Every effort was made to ensure accuracy; however, inadvertent computerized transcription errors may be present.

## 2022-08-16 ENCOUNTER — TELEPHONE (OUTPATIENT)
Dept: CARDIOLOGY CLINIC | Age: 57
End: 2022-08-16

## 2022-08-16 VITALS
RESPIRATION RATE: 16 BRPM | TEMPERATURE: 97.4 F | DIASTOLIC BLOOD PRESSURE: 84 MMHG | SYSTOLIC BLOOD PRESSURE: 135 MMHG | OXYGEN SATURATION: 96 % | HEART RATE: 69 BPM | BODY MASS INDEX: 33.09 KG/M2 | HEIGHT: 69 IN | WEIGHT: 223.4 LBS

## 2022-08-16 LAB
ANION GAP SERPL CALCULATED.3IONS-SCNC: 9 MMOL/L (ref 7–16)
BUN BLDV-MCNC: 32 MG/DL (ref 6–20)
CALCIUM SERPL-MCNC: 8.8 MG/DL (ref 8.6–10.2)
CHLORIDE BLD-SCNC: 96 MMOL/L (ref 98–107)
CO2: 34 MMOL/L (ref 22–29)
CREAT SERPL-MCNC: 1.7 MG/DL (ref 0.7–1.2)
GFR AFRICAN AMERICAN: 50
GFR NON-AFRICAN AMERICAN: 41 ML/MIN/1.73
GLUCOSE BLD-MCNC: 84 MG/DL (ref 74–99)
HCT VFR BLD CALC: 30 % (ref 37–54)
HEMOGLOBIN: 9.3 G/DL (ref 12.5–16.5)
MCH RBC QN AUTO: 26.1 PG (ref 26–35)
MCHC RBC AUTO-ENTMCNC: 31 % (ref 32–34.5)
MCV RBC AUTO: 84.3 FL (ref 80–99.9)
MIXING STUDY: NORMAL
PDW BLD-RTO: 17.6 FL (ref 11.5–15)
PLATELET # BLD: 226 E9/L (ref 130–450)
PMV BLD AUTO: 9.7 FL (ref 7–12)
POTASSIUM SERPL-SCNC: 3 MMOL/L (ref 3.5–5)
RBC # BLD: 3.56 E12/L (ref 3.8–5.8)
SODIUM BLD-SCNC: 139 MMOL/L (ref 132–146)
WBC # BLD: 8.1 E9/L (ref 4.5–11.5)

## 2022-08-16 PROCEDURE — 6370000000 HC RX 637 (ALT 250 FOR IP): Performed by: INTERNAL MEDICINE

## 2022-08-16 PROCEDURE — 6360000002 HC RX W HCPCS: Performed by: INTERNAL MEDICINE

## 2022-08-16 PROCEDURE — 36415 COLL VENOUS BLD VENIPUNCTURE: CPT

## 2022-08-16 PROCEDURE — 99239 HOSP IP/OBS DSCHRG MGMT >30: CPT | Performed by: INTERNAL MEDICINE

## 2022-08-16 PROCEDURE — 84244 ASSAY OF RENIN: CPT

## 2022-08-16 PROCEDURE — 80048 BASIC METABOLIC PNL TOTAL CA: CPT

## 2022-08-16 PROCEDURE — 85027 COMPLETE CBC AUTOMATED: CPT

## 2022-08-16 PROCEDURE — 2580000003 HC RX 258: Performed by: INTERNAL MEDICINE

## 2022-08-16 PROCEDURE — 82088 ASSAY OF ALDOSTERONE: CPT

## 2022-08-16 PROCEDURE — 36592 COLLECT BLOOD FROM PICC: CPT

## 2022-08-16 PROCEDURE — 99233 SBSQ HOSP IP/OBS HIGH 50: CPT | Performed by: INTERNAL MEDICINE

## 2022-08-16 RX ORDER — FUROSEMIDE 40 MG/1
40 TABLET ORAL 2 TIMES DAILY
Qty: 60 TABLET | Refills: 0 | Status: SHIPPED | OUTPATIENT
Start: 2022-08-16

## 2022-08-16 RX ORDER — POTASSIUM CHLORIDE 29.8 MG/ML
40 INJECTION INTRAVENOUS ONCE
Status: COMPLETED | OUTPATIENT
Start: 2022-08-16 | End: 2022-08-16

## 2022-08-16 RX ORDER — POTASSIUM CHLORIDE 20 MEQ/1
40 TABLET, EXTENDED RELEASE ORAL 2 TIMES DAILY
Qty: 60 TABLET | Refills: 0 | Status: SHIPPED | OUTPATIENT
Start: 2022-08-16 | End: 2022-09-06 | Stop reason: SDUPTHER

## 2022-08-16 RX ORDER — FUROSEMIDE 40 MG/1
40 TABLET ORAL 2 TIMES DAILY
Status: DISCONTINUED | OUTPATIENT
Start: 2022-08-16 | End: 2022-08-16 | Stop reason: HOSPADM

## 2022-08-16 RX ORDER — ATORVASTATIN CALCIUM 40 MG/1
40 TABLET, FILM COATED ORAL NIGHTLY
Status: DISCONTINUED | OUTPATIENT
Start: 2022-08-16 | End: 2022-08-16 | Stop reason: HOSPADM

## 2022-08-16 RX ORDER — AMLODIPINE BESYLATE 10 MG/1
10 TABLET ORAL DAILY
Qty: 90 TABLET | Refills: 2 | Status: SHIPPED | OUTPATIENT
Start: 2022-08-16 | End: 2022-11-01 | Stop reason: SDUPTHER

## 2022-08-16 RX ADMIN — POTASSIUM CHLORIDE 40 MEQ: 1500 TABLET, EXTENDED RELEASE ORAL at 09:03

## 2022-08-16 RX ADMIN — FUROSEMIDE 40 MG: 10 INJECTION, SOLUTION INTRAMUSCULAR; INTRAVENOUS at 09:06

## 2022-08-16 RX ADMIN — AMLODIPINE BESYLATE 10 MG: 5 TABLET ORAL at 09:05

## 2022-08-16 RX ADMIN — POTASSIUM CHLORIDE 40 MEQ: 29.8 INJECTION, SOLUTION INTRAVENOUS at 09:03

## 2022-08-16 RX ADMIN — SODIUM CHLORIDE, PRESERVATIVE FREE 10 ML: 5 INJECTION INTRAVENOUS at 09:06

## 2022-08-16 ASSESSMENT — PAIN SCALES - GENERAL: PAINLEVEL_OUTOF10: 0

## 2022-08-16 NOTE — DISCHARGE SUMMARY
[facility] Physician Discharge Summary       Jose Desouza MD  [facility]. [place] [address]  [phone]    Call today        Activity level: Avoid strenuous upper extremity activity and contact sports for at least 1 week, reassess at follow-up    Diet: ADULT DIET; Regular; Low Sodium (2 gm)    Labs: CBC and BMP on or around 8/17/2022    Condition at discharge: Stable    Dispo: Home      Patient ID:  Anca Deal  [id number]  89 y.o.  1965    Admit date: 8/12/2022    Discharge date and time:  8/16/2022  11:00 AM    Admission Diagnoses: Principal Problem:    ABLA (acute blood loss anemia)  Active Problems:    Acute decompensated heart failure (HCC)    Stage 3b chronic kidney disease (HCC)    Hypokalemia    Intramuscular hematoma    Persistent atrial fibrillation (HCC)    Ischemic cardiomyopathy    Acute on chronic heart failure with preserved ejection fraction ([address]  Resolved Problems:    * No resolved hospital problems. *      Discharge Diagnoses: Principal Problem:    ABLA (acute blood loss anemia)  Active Problems:    Acute decompensated heart failure (HCC)    Stage 3b chronic kidney disease (HCC)    Hypokalemia    Intramuscular hematoma    Persistent atrial fibrillation (HCC)    Ischemic cardiomyopathy    Acute on chronic heart failure with preserved ejection fraction ([address]  Resolved Problems:    * No resolved hospital problems. *      Consults:  IP CONSULT TO HEM/ONC  IP CONSULT TO CARDIOLOGY    Procedures: None    Hospital Course: This is a 55-year-old male with a history significant for ischemic cardiomyopathy and atrial fibrillation on chronic anticoagulation with Eliquis admitted to the hospital with acute blood loss anemia due to hematoma from right serratus anterior muscle tear on chronic anticoagulation. Anticoagulation was held, and his hemoglobin stabilized.   Patient was also experiencing acute on chronic diastolic heart failure for which she received IV Lasix. Euvolemic at time of discharge. He also had significant hypokalemia and hypomagnesemia which was treated with IV and oral replacement. Potassium was still slightly low, however patient eager for discharge to home as he has an appointment for laser corrective eye surgery this week. Discussed continuing potassium supplementation and repeat labs tomorrow. Prescription sent to his pharmacy. Paper lab orders printed and placed in discharge packet. He will follow-up with his cardiologist Dr. Josee Almonte as soon as possible. Discussed seeking immediate medical attention for any signs of worsening bleeding, shortness of breath, palpitations, chest pain, dizziness, or lightheadedness. He states understanding. Discussed with cardiology and we agree that he may proceed with eye surgery given this is a low risk procedure. Discharge Exam:  Vitals:    08/15/22 1615 08/15/22 2045 08/16/22 0345 08/16/22 0730   BP: 132/82 131/77 133/89 135/84   Pulse: 68 69 69 69   Resp: 14 18 18 16   Temp: 97.6 °F (36.4 °C) 97.1 °F (36.2 °C) 97 °F (36.1 °C) 97.4 °F (36.3 °C)   TempSrc:  Infrared Infrared    SpO2: 96% 97% 93% 96%   Weight:   223 lb 6.4 oz (101.3 kg)    Height:         General Appearance: alert and oriented to person, place and time and in no acute distress  Skin: Extensive ecchymosis starting from the right proximal mid axillary line extending down to his flank into the groin and abdomen. Pen syd around hematoma shows stable volume. See enclosed pictures from ER visit  Head: normocephalic and atraumatic  Eyes: pupils equal, round, and reactive to light, extraocular eye movements intact, conjunctivae normal  Neck: neck supple and non tender without mass  Pulmonary/Chest: Nonlabored on room air.   Diminished but otherwise clear to auscultation bilaterally  Cardiovascular: normal rate, normal S1 and S2 and no carotid bruits  Abdomen: soft, non-tender, non-distended, normal bowel sounds, no masses or organomegaly  Musculoskeletal: Large palpable hematoma in the proximal right mid axillary line but the bulk of the hematoma is posterior to the mid axillary line, unchanged from prior exam  Extremities: chronic venous stasis changes with mild pitting edema  Neurologic: no cranial nerve deficit and speech normal  I/O last 3 completed shifts: In: 1070 [P.O.:980; I.V.:90]  Out: 7000 [Urine:7000]  I/O this shift:  In: 240 [P.O.:240]  Out: -       LABS:  Recent Labs     08/15/22  0548 08/15/22  1210 08/15/22  1800 08/16/22  0400    136  --  139   K 2.7* 3.4* 3.4* 3.0*   CL 94* 95*  --  96*   CO2 33* 31*  --  34*   BUN 29* 29*  --  32*   CREATININE 1.5* 1.5*  --  1.7*   GLUCOSE 98 95  --  84   CALCIUM 8.8 9.0  --  8.8       Recent Labs     08/14/22  0450 08/15/22  0548 08/16/22  0400   WBC 9.6 8.2 8.1   RBC 3.33* 3.47* 3.56*   HGB 8.6* 9.1* 9.3*   HCT 28.5* 30.0* 30.0*   MCV 85.6 86.5 84.3   MCH 25.8* 26.2 26.1   MCHC 30.2* 30.3* 31.0*   RDW 16.7* 17.5* 17.6*    223 226   MPV 9.6 9.4 9.7         Imaging:  CT CHEST W CONTRAST    Result Date: 8/12/2022  EXAMINATION: CT OF THE CHEST WITH CONTRAST 8/12/2022 8:00 pm TECHNIQUE: CT of the chest was performed with the administration of intravenous contrast. Multiplanar reformatted images are provided for review. Automated exposure control, iterative reconstruction, and/or weight based adjustment of the mA/kV was utilized to reduce the radiation dose to as low as reasonably achievable. COMPARISON: None. HISTORY: ORDERING SYSTEM PROVIDED HISTORY: attention right back. was on eloquis, possble hematoma TECHNOLOGIST PROVIDED HISTORY: Reason for exam:->attention right back.  was on eloquis, possble hematoma Decision Support Exception - unselect if not a suspected or confirmed emergency medical condition->Emergency Medical Condition (MA) FINDINGS: Heterogeneous mass associated with right posterolateral chest wall musculature measuring 16 x 6 cm in AP and axial dimension extending approximately 17 cm in cranial caudal dimension. There is overlying soft tissue edema. No evidence of underlying rib fracture. There is a moderate size right pleural effusion. Small left pleural effusion. Atelectatic changes are present in right lung base. There is prominence of pulmonary vasculature. Moderate cardiomegaly. No pericardial effusion. Atherosclerotic calcifications associated with the coronary arteries. Evidence of coronary arterial bypass grafting. View of the upper abdomen shows ascites. 1. Heterogeneous mass associated with posterolateral right chest wall musculature related to large intramuscular hematoma with overlying soft tissue edema. 2. No evidence of underlying rib fracture. 3. Moderate right pleural effusion. Small left pleural effusion. 4. Atelectatic changes in right lung base 5. Pulmonary vascular congestion. 6. View of the upper abdomen shows ascites. CT ABDOMEN PELVIS W IV CONTRAST Additional Contrast? None    Result Date: 8/12/2022  EXAMINATION: CT OF THE ABDOMEN AND PELVIS WITH CONTRAST 8/12/2022 8:13 pm TECHNIQUE: CT of the abdomen and pelvis was performed with the administration of intravenous contrast. Multiplanar reformatted images are provided for review. Automated exposure control, iterative reconstruction, and/or weight based adjustment of the mA/kV was utilized to reduce the radiation dose to as low as reasonably achievable. COMPARISON: None. HISTORY: ORDERING SYSTEM PROVIDED HISTORY: other TECHNOLOGIST PROVIDED HISTORY: Reason for exam:->other Additional Contrast?->None Decision Support Exception - unselect if not a suspected or confirmed emergency medical condition->Emergency Medical Condition (MA) FINDINGS: Lower Chest: There is a moderate right pleural effusion a small left pleural effusion. There is adjacent atelectasis the heart size is felt to be at the upper limits of normal.  There are no signs of pericardial effusion. Organs:  The liver is dense. It reveals beading along its margins suggests cirrhotic change. No duct dilation or discrete masses were observed. The gallbladder reveals no signs of wall thickening or stones. Pancreas, spleen and adrenal glands revealed no acute abnormalities. There is residual intravenous contrast within the renal collecting system bilaterally. Extending from the inferior pole of the right kidney is a 19 mm low-density area that has appearance of a cyst.  There is slight fullness of the pelvocaliceal system. No obstructing stones are identified. This can be related to reflux GI/Bowel: The stomach and small bowel pattern with the are within the normal range. The small bowel does not appear distended. The region of the terminal ileum appendix and cecum appears within normal range there are scattered sigmoid diverticula but no signs of diverticulitis. Pelvis: The urinary bladder reveals a rounded contour. No focal wall thickening was observed. The prostate gland measures 4.4 cm that is within the normal range. Peritoneum/Retroperitoneum: There is a moderate volume of ascites within the pelvis more so left than right extending along the paracolic gutters. Ascites also surround the liver and spleen. There is marked stranding of the subcutaneous tissues concerning for a low protein state or developing anasarca. There is a large hematoma and asymmetrical soft tissue swelling along the right serratus anterior. 1. An early cirrhotic change of the liver 2. Moderate volume of ascites within the abdomen and pelvis 3. Hematoma of the right serratus anterior muscle. 4. Sigmoid diverticulosis but no signs of diverticulitis. 5. There is marked stranding of the subcutaneous tissues along the abdominal wall concerning for a low protein state.      XR CHEST PORTABLE    Result Date: 8/12/2022  EXAMINATION: ONE XRAY VIEW OF THE CHEST 8/12/2022 7:23 pm COMPARISON: August 5, 2022 HISTORY: ORDERING SYSTEM PROVIDED HISTORY: other TECHNOLOGIST PROVIDED HISTORY: Reason for exam:->other FINDINGS: Median sternotomy wires are present. Mild cardiomegaly. There is prominence of pulmonary vasculature. No focal airspace opacity or evidence of pleural effusion. No pneumothorax. Cardiomegaly with mild pulmonary vascular congestion. No obvious airspace opacity or pleural effusion. Patient Instructions:   Current Discharge Medication List        START taking these medications    Details   furosemide (LASIX) 40 MG tablet Take 1 tablet by mouth in the morning and 1 tablet in the evening. Qty: 60 tablet, Refills: 0           CONTINUE these medications which have CHANGED    Details   amLODIPine (NORVASC) 10 MG tablet Take 1 tablet by mouth in the morning. Qty: 90 tablet, Refills: 2      potassium chloride (KLOR-CON M) 20 MEQ extended release tablet Take 2 tablets by mouth in the morning and 2 tablets before bedtime. Qty: 60 tablet, Refills: 0           STOP taking these medications       metOLazone (ZAROXOLYN) 5 MG tablet Comments:   Reason for Stopping:         apixaban (ELIQUIS) 5 MG TABS tablet Comments:   Reason for Stopping:         aspirin 81 MG tablet Comments:   Reason for Stopping:                 Note that greater than 30 minutes was spent in preparing discharge papers, discussing discharge with patient, medication review, etc.    NOTE: This report was transcribed using voice recognition software. Every effort was made to ensure accuracy; however, inadvertent computerized transcription errors may be present.      Signed:  Electronically signed by Cris Oglesby DO on 8/16/2022 at 11:00 AM

## 2022-08-16 NOTE — PROGRESS NOTES
Blood and [facility]      Patient Name: William Singh  YOB: 1965  PCP: No primary care provider on file. Referring Provider:      Reason for Consultation:   Chief Complaint   Patient presents with    Bleeding/Bruising     Had a stress test this am, bruising noted to right shoulder blade down back to hips with weight gain, short of breath, no chest pain      Subjective: Feels well. No acute complaints today. No bleeding    History of Present Illness: This pt is a 60 yo male who presented to the ED after seeing his cardiologist for R chest wall hematoma. He was started on Eliquis ~1 month prior for A fib/flutter, ~1 week ago, played golf, and then the next day noted a R chest hematoma below scapula, which over the next few days expanded downward. He has been off Eliquis since 8/8. He was given fluid in ER and initial Hgb of 9.1 dropped to 8.0, but has since been stable (normal baseline is ~13-14).  He has had no other overt bleeding    Diagnostic Data:     Past Medical History:   Diagnosis Date    Atrial fibrillation ([address] 11/13/2014    post ACB    CAD (coronary artery disease)     Carotid artery stenosis     Chronic kidney disease     Diastolic CHF ([address] 56/61/3847    Echo 10/17/14 EF 90% stage I diastolic, 63/5/40  EF 71% with stage II diastolic chf    Hyperlipidemia     Hypertension     Intramuscular hematoma 8/13/2022    Obesity        Patient Active Problem List    Diagnosis Date Noted    Acute decompensated heart failure ([address] 03/19/2015    Hypokalemia 08/13/2022    Intramuscular hematoma 08/13/2022    Persistent atrial fibrillation ([address] 08/13/2022    Ischemic cardiomyopathy 08/13/2022    Acute on chronic heart failure with preserved ejection fraction ([address] 08/13/2022    Stage 3b chronic kidney disease ([address]     ABLA (acute blood loss anemia) 08/12/2022    Renal insufficiency 03/22/2015    CAD (coronary artery disease) 10/03/2014        Past differently: Take 5 mg by mouth in the morning and 5 mg before bedtime. Dr. Dalia Novak stopped this week 8/8/22. 5/24/22 8/16/22  Carmen Goldsmith MD       Allergies  Allergies   Allergen Reactions    Penicillins Anaphylaxis     Pt not exactly sure of reaction    Cucumber Extract Hives and Swelling       Review of Systems:    As in HPI, otherwise negative      Objective  /84   Pulse 69   Temp 97.4 °F (36.3 °C)   Resp 16   Ht 5' 9\" (1.753 m)   Wt 223 lb 6.4 oz (101.3 kg)   SpO2 96%   BMI 32.99 kg/m²     Physical Exam:   Performance Status:  General: AAO to person, place, time, in no acute distress, pleasant   Head and neck : PERRLA, EOMI . Sclera non icteric. Oropharynx : Clear  Neck: no JVD,  no adenopathy,  LYMPHATICS : No LAD  Heart: Regular rate and regular rhythm  Lungs: Clear to auscultation   Extremities: +BL LE edema  Abdomen: Soft, non-tender  Skin:  Ecchymosis extending from the R back down R abdomen and into his scrotal area  Neurologic:Cranial nerves grossly intact.  No focal motor deficits     Recent Laboratory Data-   Lab Results   Component Value Date    WBC 8.1 08/16/2022    HGB 9.3 (L) 08/16/2022    HCT 30.0 (L) 08/16/2022    MCV 84.3 08/16/2022     08/16/2022    LYMPHOPCT 9.3 (L) 08/14/2022    RBC 3.56 (L) 08/16/2022    MCH 26.1 08/16/2022    MCHC 31.0 (L) 08/16/2022    RDW 17.6 (H) 08/16/2022    NEUTOPHILPCT 75.9 08/14/2022    MONOPCT 10.0 08/14/2022    BASOPCT 0.5 08/14/2022    NEUTROABS 7.28 08/14/2022    LYMPHSABS 0.89 (L) 08/14/2022    MONOSABS 0.96 (H) 08/14/2022    EOSABS 0.31 08/14/2022    BASOSABS 0.05 08/14/2022       Lab Results   Component Value Date     08/16/2022    K 3.0 (L) 08/16/2022    CL 96 (L) 08/16/2022    CO2 34 (H) 08/16/2022    BUN 32 (H) 08/16/2022    CREATININE 1.7 (H) 08/16/2022    GLUCOSE 84 08/16/2022    CALCIUM 8.8 08/16/2022    PROT 7.1 08/12/2022    LABALBU 3.8 08/12/2022    BILITOT 2.4 (H) 08/12/2022    ALKPHOS 116 08/12/2022    AST 15 08/12/2022 ALT 8 08/12/2022    LABGLOM 41 08/16/2022    GFRAA 50 08/16/2022       No results found for: IRON, TIBC, FERRITIN        Radiology-    CT CHEST W CONTRAST   Final Result   1. Heterogeneous mass associated with posterolateral right chest wall   musculature related to large intramuscular hematoma with overlying soft   tissue edema. 2. No evidence of underlying rib fracture. 3. Moderate right pleural effusion. Small left pleural effusion. 4. Atelectatic changes in right lung base   5. Pulmonary vascular congestion. 6. View of the upper abdomen shows ascites. CT ABDOMEN PELVIS W IV CONTRAST Additional Contrast? None   Final Result   1. An early cirrhotic change of the liver   2. Moderate volume of ascites within the abdomen and pelvis   3. Hematoma of the right serratus anterior muscle. 4. Sigmoid diverticulosis but no signs of diverticulitis. 5. There is marked stranding of the subcutaneous tissues along the abdominal   wall concerning for a low protein state. XR CHEST PORTABLE   Final Result   Cardiomegaly with mild pulmonary vascular congestion. No obvious airspace   opacity or pleural effusion. ASSESSMENT/PLAN :  60 yo male  R chest hematoma/ecchymosis  Hx of acute on chronic DHF    - On Eliquis for A fib/flutter. Started 1 month ago. No significant trauma, but developed a hematoma which has expanded downward. Has been on Eliquis since 8/8/22  - Coag from 8/12 with mildly increased PT of 14.1, INR 1.2, PTT of 29  - Hgb has been stable since yesterday. Likely no ongoing bleeding at this time point, but would continue to monitor Hgb  - I doubt a primary bleeding diathesis, as he has never had a clinically significant bleeding episode in the past (including multiple surgical procedures). Normal platelet count. BUN is slightly elevated form CKD, but not enough to cause uremic platelet dysfunction.  Coagulation profile is not significantly impaired as above  - The mildly elevated bili is likely mostly indirect, and not related to intravascular hemolysis but break down of the hematoma  - Will check PFA, mixing study and vW profile  - Most likely scenario is a muscle tear with subsequent bleeding and hematoma expansion downward while on an agent which is known to cause increased risk for bleeding  - Will defer to cardiology, but if patient must resume Eliquis, may be better to have him on 2.5 mg BID rather than 5 mg BID    Thank you for this consult. Please call with further questions or concerns    8/15/2022  Feeling better today but remains concerned about his hematomas.   No overt bleeding  Hematoma related to anticoagulation with Eliquis  BUN minimally elevated and would not affect platelet function at such level  CBC shows improved hemoglobin to 9.1 with a normal platelet count and normal WBC count  Eliquis remains on hold  Platelet function studies nondiagnostic    8/16/22  - CBC stable  - PFA non-diagnostic as above  - Mixing study corrected  - Will follow with cardiology on DC    Electronically signed by Dayna Vogel MD on 8/16/2022 at 1:03 PM

## 2022-08-16 NOTE — DISCHARGE INSTRUCTIONS
Atrial Fibrillation: Care Instructions  Your Care Instructions     Atrial fibrillation is an irregular and often fast heartbeat. Treating this condition is important for several reasons. It can cause blood clots, which can travel from your heart to your brain and cause a stroke. If you have a fast heartbeat, you may feel lightheaded, dizzy, and weak. An irregular heartbeatcan also increase your risk for heart failure. Atrial fibrillation is often the result of another heart condition, such as high blood pressure or coronary artery disease. Making changes to improve yourheart condition will help you stay healthy and active. Follow-up care is a key part of your treatment and safety. Be sure to make and go to all appointments, and call your doctor if you are having problems. It's also a good idea to know your test results and keep alist of the medicines you take. How can you care for yourself at home? Medicines    Take your medicines exactly as prescribed. Call your doctor if you think you are having a problem with your medicine. You will get more details on the specific medicines your doctor prescribes. If your doctor has given you a blood thinner to prevent a stroke, be sure you get instructions about how to take your medicine safely. Blood thinners can cause serious bleeding problems. Do not take any vitamins, over-the-counter drugs, or herbal products without talking to your doctor first.   Lifestyle changes    Do not smoke. Smoking can increase your chance of a stroke and heart attack. If you need help quitting, talk to your doctor about stop-smoking programs and medicines. These can increase your chances of quitting for good. Eat a heart-healthy diet. Stay at a healthy weight. Lose weight if you need to. Limit alcohol to 2 drinks a day for men and 1 drink a day for women. Too much alcohol can cause health problems. Avoid colds and flu. Get a pneumococcal vaccine shot.  If you have had one before, ask your doctor whether you need another dose. Get a flu shot every year. If you must be around people with colds or flu, wash your hands often. Activity    If your doctor recommends it, get more exercise. Walking is a good choice. Bit by bit, increase the amount you walk every day. Try for at least 30 minutes on most days of the week. You also may want to swim, bike, or do other activities. Your doctor may suggest that you join a cardiac rehabilitation program so that you can have help increasing your physical activity safely. Start light exercise if your doctor says it is okay. Even a small amount will help you get stronger, have more energy, and manage stress. Walking is an easy way to get exercise. Start out by walking a little more than you did in the hospital. Gradually increase the amount you walk. When you exercise, watch for signs that your heart is working too hard. You are pushing too hard if you cannot talk while you are exercising. If you become short of breath or dizzy or have chest pain, sit down and rest immediately. Check your pulse regularly. Place two fingers on the artery at the palm side of your wrist, in line with your thumb. If your heartbeat seems uneven or fast, talk to your doctor. When should you call for help? Call 911 anytime you think you may need emergency care. For example, call if:    You have symptoms of a heart attack. These may include:  Chest pain or pressure, or a strange feeling in the chest.  Sweating. Shortness of breath. Nausea or vomiting. Pain, pressure, or a strange feeling in the back, neck, jaw, or upper belly or in one or both shoulders or arms. Lightheadedness or sudden weakness. A fast or irregular heartbeat. After you call 911, the  may tell you to chew 1 adult-strength or 2 to 4 low-dose aspirin. Wait for an ambulance. Do not try to drive yourself. You have symptoms of a stroke.  These may include:  Sudden numbness, tingling, weakness, or loss of movement in your face, arm, or leg, especially on only one side of your body. Sudden vision changes. Sudden trouble speaking. Sudden confusion or trouble understanding simple statements. Sudden problems with walking or balance. A sudden, severe headache that is different from past headaches. You passed out (lost consciousness). Call your doctor now or seek immediate medical care if:    You have new or increased shortness of breath. You feel dizzy or lightheaded, or you feel like you may faint. Your heart rate becomes irregular. You can feel your heart flutter in your chest or skip heartbeats. Tell your doctor if these symptoms are new or worse. Watch closely for changes in your health, and be sure to contact your doctor ifyou have any problems. Where can you learn more? Go to [email or web address]://[email or web address]. org and sign in to your [employer] account. Enter U020 in the [employer] box to learn more about \"Atrial Fibrillation: Care Instructions. \"     If you do not have an account, please click on the \"Sign Up Now\" link. Current as of: January 10, 2022               Content Version: 13.3  © 7507-9845 [employer]. Care instructions adapted under license by [facility] ([facility]). If you have questions about a medical condition or this instruction, always ask your healthcare professional. [address] any warranty or liability for your use of this information.

## 2022-08-16 NOTE — TELEPHONE ENCOUNTER
Patient calling in to state he is out of the hospital & would like to know when he needs to follow up in office. He is currently off of his Eliquis.

## 2022-08-17 ENCOUNTER — HOSPITAL ENCOUNTER (OUTPATIENT)
Age: 57
Discharge: HOME | End: 2022-08-17
Payer: COMMERCIAL

## 2022-08-17 DIAGNOSIS — E87.6 HYPOKALEMIA: ICD-10-CM

## 2022-08-17 DIAGNOSIS — D62 ABLA (ACUTE BLOOD LOSS ANEMIA): ICD-10-CM

## 2022-08-17 LAB
ANION GAP SERPL CALCULATED.3IONS-SCNC: 10 MMOL/L (ref 7–16)
BUN BLDV-MCNC: 37 MG/DL (ref 6–20)
CALCIUM SERPL-MCNC: 9 MG/DL (ref 8.6–10.2)
CHLORIDE BLD-SCNC: 96 MMOL/L (ref 98–107)
CO2: 32 MMOL/L (ref 22–29)
CREAT SERPL-MCNC: 1.7 MG/DL (ref 0.7–1.2)
GFR AFRICAN AMERICAN: 50
GFR NON-AFRICAN AMERICAN: 41 ML/MIN/1.73
GLUCOSE BLD-MCNC: 153 MG/DL (ref 74–99)
HCT VFR BLD CALC: 32.8 % (ref 37–54)
HEMOGLOBIN: 10 G/DL (ref 12.5–16.5)
MCH RBC QN AUTO: 26.3 PG (ref 26–35)
MCHC RBC AUTO-ENTMCNC: 30.5 % (ref 32–34.5)
MCV RBC AUTO: 86.3 FL (ref 80–99.9)
PDW BLD-RTO: 18.5 FL (ref 11.5–15)
PLATELET # BLD: 235 E9/L (ref 130–450)
PMV BLD AUTO: 9.2 FL (ref 7–12)
POTASSIUM SERPL-SCNC: 3.8 MMOL/L (ref 3.5–5)
RBC # BLD: 3.8 E12/L (ref 3.8–5.8)
SODIUM BLD-SCNC: 138 MMOL/L (ref 132–146)
VON WILLEBRAND ACTIVITY RCF: 83 % (ref 51–215)
VON WILLEBRAND AG: 95 % (ref 52–214)
WBC # BLD: 9.8 E9/L (ref 4.5–11.5)

## 2022-08-17 PROCEDURE — 85027 COMPLETE CBC AUTOMATED: CPT

## 2022-08-17 PROCEDURE — 80048 BASIC METABOLIC PNL TOTAL CA: CPT

## 2022-08-17 PROCEDURE — 36415 COLL VENOUS BLD VENIPUNCTURE: CPT

## 2022-08-18 LAB
ALDOSTERONE: 44.1 NG/DL
Lab: NORMAL
REPORT: NORMAL
THIS TEST SENT TO: NORMAL

## 2022-08-19 NOTE — TELEPHONE ENCOUNTER
I am sure there must be some misunderstanding, cannot do cardioversion with him of the anticoagulation right now I think we should continue current medical therapy

## 2022-09-06 RX ORDER — POTASSIUM CHLORIDE 20 MEQ/1
40 TABLET, EXTENDED RELEASE ORAL 2 TIMES DAILY
Qty: 120 TABLET | Refills: 1 | OUTPATIENT
Start: 2022-09-06

## 2022-09-07 ENCOUNTER — HOSPITAL ENCOUNTER (OUTPATIENT)
Age: 57
Discharge: HOME | End: 2022-09-07
Payer: COMMERCIAL

## 2022-09-07 ENCOUNTER — OFFICE VISIT (OUTPATIENT)
Dept: CARDIOLOGY CLINIC | Age: 57
End: 2022-09-07
Payer: COMMERCIAL

## 2022-09-07 VITALS
HEART RATE: 71 BPM | BODY MASS INDEX: 31.83 KG/M2 | HEIGHT: 68 IN | RESPIRATION RATE: 16 BRPM | WEIGHT: 210 LBS | DIASTOLIC BLOOD PRESSURE: 86 MMHG | SYSTOLIC BLOOD PRESSURE: 134 MMHG

## 2022-09-07 DIAGNOSIS — R06.09 DOE (DYSPNEA ON EXERTION): Primary | ICD-10-CM

## 2022-09-07 DIAGNOSIS — I25.10 CORONARY ARTERY DISEASE WITHOUT ANGINA PECTORIS, UNSPECIFIED VESSEL OR LESION TYPE, UNSPECIFIED WHETHER NATIVE OR TRANSPLANTED HEART: ICD-10-CM

## 2022-09-07 LAB
ANION GAP SERPL CALCULATED.3IONS-SCNC: 12 MMOL/L (ref 7–16)
BUN BLDV-MCNC: 28 MG/DL (ref 6–20)
CALCIUM SERPL-MCNC: 9.9 MG/DL (ref 8.6–10.2)
CHLORIDE BLD-SCNC: 97 MMOL/L (ref 98–107)
CO2: 29 MMOL/L (ref 22–29)
CREAT SERPL-MCNC: 1.6 MG/DL (ref 0.7–1.2)
GFR AFRICAN AMERICAN: 54
GFR NON-AFRICAN AMERICAN: 44 ML/MIN/1.73
GLUCOSE BLD-MCNC: 86 MG/DL (ref 74–99)
POTASSIUM SERPL-SCNC: 3.4 MMOL/L (ref 3.5–5)
SODIUM BLD-SCNC: 138 MMOL/L (ref 132–146)

## 2022-09-07 PROCEDURE — 80048 BASIC METABOLIC PNL TOTAL CA: CPT

## 2022-09-07 PROCEDURE — 36415 COLL VENOUS BLD VENIPUNCTURE: CPT

## 2022-09-07 PROCEDURE — 93000 ELECTROCARDIOGRAM COMPLETE: CPT | Performed by: INTERNAL MEDICINE

## 2022-09-07 PROCEDURE — 99214 OFFICE O/P EST MOD 30 MIN: CPT | Performed by: INTERNAL MEDICINE

## 2022-09-08 ENCOUNTER — TELEPHONE (OUTPATIENT)
Dept: CARDIOLOGY CLINIC | Age: 57
End: 2022-09-08

## 2022-09-08 DIAGNOSIS — E87.6 HYPOKALEMIA: Primary | ICD-10-CM

## 2022-09-08 NOTE — TELEPHONE ENCOUNTER
DR Adan PICKENS CALLED TO SEE HOW HIS LABS WERE AND TO SEE IF IT IS OK TO TAKE HIS MEDICATIONS.  HIS POTASSIUM WAS WHAT YOU WERE QUESTIONING

## 2022-09-08 NOTE — TELEPHONE ENCOUNTER
Potassium is just borderline low, can increase potassium to 2 by mouth 3 times daily  Basic metabolic panel in 2 weeks

## 2022-09-15 NOTE — PROGRESS NOTES
[facility] Cardiology Progress Note  Dr. Eliud Tejada      Referring Physician: Eliud Tejada MD  CHIEF COMPLAINT:   Chief Complaint   Patient presents with    Congestive Heart Failure     Follow up       HISTORY OF PRESENT ILLNESS:   60year old male with history of CAD, atrial fibrillation, CKD, CHF, hypertension and hyperlipidemia is here for a follow up visit. Patient was seen in June for acute exacerbation of congestive heart failure, got better then got worse, is here for evaluation, still complaining of dyspnea on exertion, pedal edema, no PND, no orthopnea, occasional cough, denies any chest pain, no palpitations, no lightheadedness or dizziness, no syncope, no presyncopal episodes. Blood pressure has not been well controlled    Past Medical History:   Diagnosis Date    Atrial fibrillation ([facility] [address] 11/13/2014    post ACB    CAD (coronary artery disease)     Carotid artery stenosis     Chronic kidney disease     Diastolic CHF ([facility] [address] 36/92/1121    Echo 10/17/14 EF 44% stage I diastolic, 13/8/33  EF 30% with stage II diastolic chf    Hyperlipidemia     Hypertension     Intramuscular hematoma 8/13/2022    Obesity          Past Surgical History:   Procedure Laterality Date    CARDIAC CATHETERIZATION  10/31/14    severe triple vessel disease    CARDIAC SURGERY      stent x 2  in 2006 [facility]    CORONARY ANGIOPLASTY      CORONARY ARTERY BYPASS GRAFT  11-5-14    x 5    DIAGNOSTIC CARDIAC CATH LAB PROCEDURE  3/21/07    [facility]: Abnormal LV status, global hypokinesis, EF 50%. No AS, no MR. Left main luminal irregular. LAD prox 40-50% stenosis. Diag 1 prox 100% stenosis fills via collaterals from LAD. LCX prox 100% stenosis. Fills via collaterals from RCA. RCA distal 90% stenosis. PDA-right luminal irregular. RPLS prox 90% stenosis. Double vessel disease, severe. LV dysfunction mild.     DIAGNOSTIC CARDIAC CATH LAB PROCEDURE  3/22/07    stent x2, [facility]: Successful PCI of RCA, wheezing, + hemoptysis  GASTROINTESTINAL:  negative for nausea, vomiting, diarrhea, constipation, pruritus and jaundice  HEMATOLOGIC/LYMPHATIC:  negative for easy bruising, bleeding, lymphadenopathy and petechiae  ENDOCRINE:  negative for heat intolerance, cold intolerance, tremor, hair loss and diabetic symptoms including neither polyuria nor polydipsia nor blurred vision  MUSCULOSKELETAL:  negative for  myalgias, arthralgias, joint swelling, stiff joints and decreased range of motion  NEUROLOGICAL:  negative for memory problems, speech problems, visual disturbance, dysphagia, weakness and numbness      PHYSICAL EXAM:   CONSTITUTIONAL: awake, alert, cooperative, no apparent distress, and appears stated age  HEAD: normocepalic, without obvious abnormality, atraumatic  NECK: Supple, symmetrical, trachea midline, no adenopathy, thyroid symmetric  LUNGS: No increased work of breathing, good air exchange, basilar rales  CARDIOVASCULAR: Normal apical impulse, irregularly irregular, normal S1 and S2, no S3 or S4, and no murmur noted and no JVD, no carotid bruit. + pitting edema up to his thighs right more than left. ABDOMEN: Soft, nontender, no masses, no organomegaly, BS+  MUSCULOSKELETAL: No clubbing no cyanosis. there is no redness, warmth, or swelling of the joints.   NEUROLOGIC: Alert, awake,oriented x3    /86   Pulse 71   Resp 16   Ht 5' 8\" (1.727 m)   Wt 210 lb (95.3 kg)   BMI 31.93 kg/m²     DATA:   I personally reviewed the visit EKG with the following interpretation: Atrial flutter with controlled ventricular response, PVCs versus aberrantly conducted beats, nonspecific T wave changes    EKG 6/20/2022, atrial fibrillation, controlled ventricular response, incomplete right bundle branch block, occasional PVCs versus aberrantly conducted beats, nonspecific T wave changes    EKG 5/21/2022, atrial fibrillation, slow ventricular response, incomplete right bundle branch block    EKG 3/12/21  Rhythm, nonspecific T wave changes in the lateral leads, poor R wave progression and first-degree AV block    ECHO: 7/19/2022,   Left ventricular internal dimensions were normal in diastole and systole. Moderate left ventricular concentric hypertrophy noted. Abnormal (paradoxical) motion consistent with conduction abnormality. Low normal left ventricular systolic function. The left atrium is severely dilated. Moderately enlarged right atrium size. Normal mitral valve leaflet thickness with apical tethering. Moderate centrally directed mitral regurgitation. Physiologic and/or trace aortic regurgitation is noted. Mild tricuspid regurgitation. Pulmonary hypertension is moderate . There is a trivial circumferential pericardial effusion noted. 5/11/15 Summary   Compared to prior echo, no changes noted. Technically adequate study. Mild asymmetric left ventricular hypertrophy   Ejection fraction is visually estimated at 50%. Mild global wall hypokinesis. There is doppler evidence of stage III diastolic dysfunction. Right ventricle global systolic function is mildly reduced . Mild tricuspid regurgitation. RVSP was not measured. Stress Test:     Angiography: 10/31/14 IMPRESSION:  Severe triple-vessel disease.        Cardiology Labs: BMP:    Lab Results   Component Value Date/Time     09/07/2022 10:28 AM    K 3.4 09/07/2022 10:28 AM    K 2.9 08/14/2022 04:50 AM    CL 97 09/07/2022 10:28 AM    CO2 29 09/07/2022 10:28 AM    BUN 28 09/07/2022 10:28 AM    CREATININE 1.6 09/07/2022 10:28 AM     CMP:    Lab Results   Component Value Date/Time     09/07/2022 10:28 AM    K 3.4 09/07/2022 10:28 AM    K 2.9 08/14/2022 04:50 AM    CL 97 09/07/2022 10:28 AM    CO2 29 09/07/2022 10:28 AM    BUN 28 09/07/2022 10:28 AM    CREATININE 1.6 09/07/2022 10:28 AM    PROT 7.1 08/12/2022 07:14 PM     CBC:    Lab Results   Component Value Date/Time    WBC 9.8 08/17/2022 03:39 PM    RBC 3.80 08/17/2022 03:39 PM    HGB 10.0 08/17/2022 03:39 PM    HCT 32.8 08/17/2022 03:39 PM    MCV 86.3 08/17/2022 03:39 PM    RDW 18.5 08/17/2022 03:39 PM     08/17/2022 03:39 PM     PT/INR:  No results found for: PTINR  PT/INR Warfarin:  No components found for: PTPATWAR, PTINRWAR  PTT:    Lab Results   Component Value Date/Time    APTT 29.3 08/12/2022 07:14 PM     PTT Heparin:  No components found for: APTTHEP  Magnesium:    Lab Results   Component Value Date/Time    MG 2.0 08/15/2022 12:10 PM     TSH:    Lab Results   Component Value Date/Time    TSH 3.170 08/05/2022 02:22 PM     TROPONIN:  No components found for: TROP  BNP:    Lab Results   Component Value Date/Time    BNP 1,384 11/24/2014 06:20 PM     FASTING LIPID PANEL:    Lab Results   Component Value Date/Time    CHOL 128 10/29/2014 11:32 AM    HDL 44 08/15/2022 05:48 AM    TRIG 131 10/29/2014 11:32 AM     No orders to display     I have personally reviewed the laboratory, cardiac diagnostic and radiographic testing as outlined above:      IMPRESSION:  1. Chronic diastolic congestive heart failure: Compensated  2. Atrial fibrillation: Persistent, newly diagnosed, controlled ventricular response, [id number] score of 3, anticoagulation is on hold due to spontaneous right chest and back hematoma  3. Hypertension: Controlled  4. Coronary artery disease status post CABG:left internal mammary artery to the LAD, saphenous vein graft to the diagonal branch of the LAD, saphenous vein graft to the posterolateral with a Y graft to the PDA, and the left radial artery to the obtuse marginal, s/p remote PCI to RCA and LAD, give Lexiscan stress test for ischemia  5. Mitral valve regurgitation: Moderate  6. Tricuspid valve regurgitation: Mild  7. Chronic anticoagulation  8. Stage III chronic kidney disease  9. Right chest and back hematoma: Spontaneous secondary to Eliquis, resolved    RECOMMENDATIONS:   1.  will resume Eliquis   2.  will continue the rest of medications   3. Basic metabolic panel. 4.  Increase risk of bleeding due to being on anti-coagulation, symptoms and signs of bleeding discussed with patient, patient was advised to seek medical attention at the earliest symptoms or signs of bleeding. 5.  CHF: Daily weight, take an extra Bumex for weight gain of more than 2-3 pounds in 24 hours, compliance with diuretics, low-salt diet were all advised. 6.  Follow-up with Dr. Tiffany Dean as scheduled  7. Follow-up with Dr. Liyah Fernandez in 3 months, sooner if symptomatic for any reason    I have reviewed my findings and recommendations with patient    Electronically signed by Wes Funk MD on 9/15/2022 at 6:04 PM    NOTE: This report was transcribed using voice recognition software.  Every effort was made to ensure accuracy; however, inadvertent computerized transcription errors may be present

## 2022-09-16 ENCOUNTER — TELEPHONE (OUTPATIENT)
Dept: CARDIOLOGY CLINIC | Age: 57
End: 2022-09-16

## 2022-09-16 NOTE — TELEPHONE ENCOUNTER
----- Message from Dola Siemens, MD sent at 8/12/2022  5:20 PM EDT -----  Brad Herrera  Would you please let the patient know that the stress test showed old scarring from previous heart attack, which is expected given his history, I will see him as scheduled  Thank you

## 2022-09-30 ENCOUNTER — TELEPHONE (OUTPATIENT)
Dept: CARDIOLOGY CLINIC | Age: 57
End: 2022-09-30

## 2022-10-04 DIAGNOSIS — I48.19 PERSISTENT ATRIAL FIBRILLATION (HCC): Primary | ICD-10-CM

## 2022-10-10 ENCOUNTER — TELEPHONE (OUTPATIENT)
Dept: CARDIOLOGY CLINIC | Age: 57
End: 2022-10-10

## 2022-10-10 NOTE — TELEPHONE ENCOUNTER
[facility] from [address] [place] [address] called & said that this patient is having a cardioversion tomorrow, and they are asking for an order for treatment consent     They will also need an H&P

## 2022-10-10 NOTE — PROGRESS NOTES
[address] [facility]                                                                                                                    PRE OP INSTRUCTIONS FOR  Kade Petersen        Date: 10/10/2022    Date of surgery: 10/11/22   Arrival Time: Hospital will call you between 5pm and 7pm with your final arrival time for surgery    Nothing by mouth (NPO) as instructed.____________________________________________________________________    Take the following medications with a small sip of water on the morning of Surgery: amlodipine, eliquis     Diabetics may take evening dose of insulin but none after midnight. If you feel symptomatic or low blood sugar morning of surgery drink 1-2 ounces of apple juice only. Aspirin, Ibuprofen, Advil, Naproxen, Vitamin E and other Anti-inflammatory products should be stopped  before surgery  as directed by your physician. Take Tylenol only unless instructed otherwise by your surgeon. Check with your Doctor regarding stopping Plavix, Coumadin, Lovenox, Eliquis, Effient, or other blood thinners. Do not smoke,use illicit drugs and do not drink any alcoholic beverages 24 hours prior to surgery. You may brush your teeth the morning of surgery. DO NOT SWALLOW WATER    You MUST make arrangements for a responsible adult to take you home after your surgery. You will not be allowed to leave alone or drive yourself home. It is strongly suggested someone stay with you the first 24 hrs. Your surgery will be cancelled if you do not have a ride home. PEDIATRIC PATIENTS ONLY:  A parent/legal guardian must accompany a child scheduled for surgery and plan to stay at the hospital until the child is discharged. Please do not bring other children with you.     Please wear simple, loose fitting clothing to the hospital.  Karlos Rivera not bring valuables (money, credit cards, checkbooks, etc.) Do not wear any makeup (including no eye makeup) or nail polish on your fingers or toes.    DO NOT wear any jewelry or piercings on day of surgery. All body piercing jewelry must be removed. Shower the night before surgery with _x__Antibacterial soap /PETER WIPES________    TOTAL JOINT REPLACEMENT/HYSTERECTOMY PATIENTS ONLY---Remember to bring Blood Bank bracelet to the hospital on the day of surgery. If you have a Living Will and Durable Power of  for Healthcare, please bring in a copy. If appropriate bring crutches, inspirex, WALKER, CANE etc... Notify your Surgeon if you develop any illness between now and surgery time, cough, cold, fever, sore throat, nausea, vomiting, etc.  Please notify your surgeon if you experience dizziness, shortness of breath or blurred vision between now & the time of your surgery. If you have ___dentures, they will be removed before going to the OR; we will provide you a container. If you wear ___contact lenses or ___glasses, they will be removed; please bring a case for them. To provide excellent care visitors will be limited to 2 in the room at any given time. Please bring picture ID and insurance card. During flu season no children under the age of 15 are permitted in the hospital for the safety of all patients. Other please check in at the information desk. Please call AMBULATORY CARE if you have any further questions.    [address] [facility]     [address]

## 2022-10-11 ENCOUNTER — ANESTHESIA EVENT (OUTPATIENT)
Dept: POSTOP/PACU | Age: 57
End: 2022-10-11

## 2022-10-11 ENCOUNTER — ANESTHESIA (OUTPATIENT)
Dept: POSTOP/PACU | Age: 57
End: 2022-10-11

## 2022-10-11 ENCOUNTER — HOSPITAL ENCOUNTER (OUTPATIENT)
Dept: POSTOP/PACU | Age: 57
Setting detail: OUTPATIENT SURGERY
Discharge: HOME | End: 2022-10-11
Payer: COMMERCIAL

## 2022-10-11 VITALS
DIASTOLIC BLOOD PRESSURE: 80 MMHG | RESPIRATION RATE: 18 BRPM | SYSTOLIC BLOOD PRESSURE: 138 MMHG | HEART RATE: 59 BPM | WEIGHT: 206 LBS | TEMPERATURE: 97 F | BODY MASS INDEX: 30.51 KG/M2 | OXYGEN SATURATION: 97 % | HEIGHT: 69 IN

## 2022-10-11 PROBLEM — I48.0 PAROXYSMAL ATRIAL FIBRILLATION (HCC): Status: ACTIVE | Noted: 2022-10-11

## 2022-10-11 LAB
EKG ATRIAL RATE: 277 BPM
EKG Q-T INTERVAL: 472 MS
EKG QRS DURATION: 114 MS
EKG QTC CALCULATION (BAZETT): 472 MS
EKG R AXIS: -48 DEGREES
EKG T AXIS: 130 DEGREES
EKG VENTRICULAR RATE: 60 BPM

## 2022-10-11 PROCEDURE — 7100000001 HC PACU RECOVERY - ADDTL 15 MIN

## 2022-10-11 PROCEDURE — 92960 CARDIOVERSION ELECTRIC EXT: CPT | Performed by: INTERNAL MEDICINE

## 2022-10-11 PROCEDURE — 3700000001 HC ADD 15 MINUTES (ANESTHESIA)

## 2022-10-11 PROCEDURE — 2580000003 HC RX 258

## 2022-10-11 PROCEDURE — 3700000000 HC ANESTHESIA ATTENDED CARE

## 2022-10-11 PROCEDURE — 7100000011 HC PHASE II RECOVERY - ADDTL 15 MIN

## 2022-10-11 PROCEDURE — 93005 ELECTROCARDIOGRAM TRACING: CPT | Performed by: INTERNAL MEDICINE

## 2022-10-11 PROCEDURE — 6360000002 HC RX W HCPCS

## 2022-10-11 PROCEDURE — 93005 ELECTROCARDIOGRAM TRACING: CPT | Performed by: ANESTHESIOLOGY

## 2022-10-11 PROCEDURE — 36415 COLL VENOUS BLD VENIPUNCTURE: CPT

## 2022-10-11 PROCEDURE — 7100000000 HC PACU RECOVERY - FIRST 15 MIN

## 2022-10-11 PROCEDURE — 92960 CARDIOVERSION ELECTRIC EXT: CPT

## 2022-10-11 PROCEDURE — 7100000010 HC PHASE II RECOVERY - FIRST 15 MIN

## 2022-10-11 RX ORDER — SODIUM CHLORIDE, SODIUM LACTATE, POTASSIUM CHLORIDE, CALCIUM CHLORIDE 600; 310; 30; 20 MG/100ML; MG/100ML; MG/100ML; MG/100ML
INJECTION, SOLUTION INTRAVENOUS CONTINUOUS
Status: DISCONTINUED | OUTPATIENT
Start: 2022-10-11 | End: 2022-10-12 | Stop reason: HOSPADM

## 2022-10-11 RX ORDER — SODIUM CHLORIDE, SODIUM LACTATE, POTASSIUM CHLORIDE, CALCIUM CHLORIDE 600; 310; 30; 20 MG/100ML; MG/100ML; MG/100ML; MG/100ML
INJECTION, SOLUTION INTRAVENOUS CONTINUOUS PRN
Status: DISCONTINUED | OUTPATIENT
Start: 2022-10-11 | End: 2022-10-11 | Stop reason: SDUPTHER

## 2022-10-11 RX ORDER — PROPOFOL 10 MG/ML
INJECTION, EMULSION INTRAVENOUS PRN
Status: DISCONTINUED | OUTPATIENT
Start: 2022-10-11 | End: 2022-10-11 | Stop reason: SDUPTHER

## 2022-10-11 RX ADMIN — PROPOFOL 120 MG: 10 INJECTION, EMULSION INTRAVENOUS at 09:10

## 2022-10-11 RX ADMIN — PROPOFOL 30 MG: 10 INJECTION, EMULSION INTRAVENOUS at 09:12

## 2022-10-11 RX ADMIN — SODIUM CHLORIDE, POTASSIUM CHLORIDE, SODIUM LACTATE AND CALCIUM CHLORIDE: 600; 310; 30; 20 INJECTION, SOLUTION INTRAVENOUS at 09:10

## 2022-10-11 ASSESSMENT — PAIN - FUNCTIONAL ASSESSMENT: PAIN_FUNCTIONAL_ASSESSMENT: NONE - DENIES PAIN

## 2022-10-11 NOTE — ANESTHESIA PRE PROCEDURE
Department of Anesthesiology  Preprocedure Note       Name:  López Smart   Age:  59 y.o.  :  1965                                          MRN:  [id number]         Date:  10/11/2022      Surgeon: Minna Corrigan    Procedure: CARDIOVERSION    Medications prior to admission:   Prior to Admission medications    Medication Sig Start Date End Date Taking? Authorizing Provider   apixaban (ELIQUIS) 5 MG TABS tablet Take by mouth 2 times daily   Yes Historical Provider, MD   potassium chloride (KLOR-CON M) 20 MEQ extended release tablet Take 2 tablets by mouth 2 times daily 22   Dolores Morgan MD   amLODIPine (NORVASC) 10 MG tablet Take 1 tablet by mouth in the morning. 22   Ruben Sy DO   furosemide (LASIX) 40 MG tablet Take 1 tablet by mouth in the morning and 1 tablet in the evening. 22   Ruben Sy DO   metOLazone (ZAROXOLYN) 5 MG tablet Take 1 tablet by mouth in the morning. 22  Dolores Morgan MD       Current medications:    Current Outpatient Medications   Medication Sig Dispense Refill    apixaban (ELIQUIS) 5 MG TABS tablet Take by mouth 2 times daily      potassium chloride (KLOR-CON M) 20 MEQ extended release tablet Take 2 tablets by mouth 2 times daily 120 tablet 1    amLODIPine (NORVASC) 10 MG tablet Take 1 tablet by mouth in the morning. 90 tablet 2    furosemide (LASIX) 40 MG tablet Take 1 tablet by mouth in the morning and 1 tablet in the evening. 60 tablet 0     Current Facility-Administered Medications   Medication Dose Route Frequency Provider Last Rate Last Admin    lactated ringers infusion   IntraVENous Continuous Konrad Keith DO           Allergies:     Allergies   Allergen Reactions    Penicillins Anaphylaxis     Pt not exactly sure of reaction    Cucumber Extract Hives and Swelling       Problem List:    Patient Active Problem List   Diagnosis Code    CAD (coronary artery disease) I25.10    Acute decompensated heart failure ([facility] [address] I50.9    Renal insufficiency N28.9    ABLA (acute blood loss anemia) D62    Stage 3b chronic kidney disease (HCC) N18.32    Hypokalemia E87.6    Intramuscular hematoma T14. 8XXA    Persistent atrial fibrillation (HCC) I48.19    Ischemic cardiomyopathy I25.5    Acute on chronic heart failure with preserved ejection fraction (HCC) I50.33       Past Medical History:        Diagnosis Date    Atrial fibrillation ([facility] [address] 11/13/2014    post ACB    CAD (coronary artery disease)     Carotid artery stenosis     Chronic kidney disease     Diastolic CHF ([facility] [address] 79/67/1855    Echo 10/17/14 EF 47% stage I diastolic, 66/6/74  EF 18% with stage II diastolic chf    Hyperlipidemia     Hypertension     Intramuscular hematoma 8/13/2022    Obesity        Past Surgical History:        Procedure Laterality Date    CARDIAC CATHETERIZATION  10/31/14    severe triple vessel disease    CARDIAC SURGERY      stent x 2  in 2006 [facility]    CORONARY ANGIOPLASTY      CORONARY ARTERY BYPASS GRAFT  11-5-14    x 5    DIAGNOSTIC CARDIAC CATH LAB PROCEDURE  3/21/07    [facility]: Abnormal LV status, global hypokinesis, EF 50%. No AS, no MR. Left main luminal irregular. LAD prox 40-50% stenosis. Diag 1 prox 100% stenosis fills via collaterals from LAD. LCX prox 100% stenosis. Fills via collaterals from RCA. RCA distal 90% stenosis. PDA-right luminal irregular. RPLS prox 90% stenosis. Double vessel disease, severe. LV dysfunction mild.     DIAGNOSTIC CARDIAC CATH LAB PROCEDURE  3/22/07    stent x2, [facility]: Successful PCI of RCA, native vessel    ELBOW SURGERY  1969    left negative tumor       Social History:    Social History     Tobacco Use    Smoking status: Former     Types: Cigarettes     Start date: 1/1/2000    Smokeless tobacco: Never   Substance Use Topics    Alcohol use: No     Comment: once or twice a month                                 Counseling given: Not Answered      Vital Signs (Current):   Vitals:    10/11/22 0750   BP: 139/82   Pulse: 86   Resp: 16   Temp: 97.8 °F (36.6 °C)   TempSrc: Infrared   SpO2: 99%   Weight: 206 lb (93.4 kg)   Height: 5' 9\" (1.753 m)                                              BP Readings from Last 3 Encounters:   10/11/22 139/82   09/07/22 134/86   08/16/22 135/84       NPO Status: Time of last liquid consumption: 0645 (small sip of water with meds)                        Time of last solid consumption: 2100                        Date of last liquid consumption: 10/11/22                        Date of last solid food consumption: 10/10/22    BMI:   Wt Readings from Last 3 Encounters:   10/11/22 206 lb (93.4 kg)   09/07/22 210 lb (95.3 kg)   08/16/22 223 lb 6.4 oz (101.3 kg)     Body mass index is 30.42 kg/m². CBC:   Lab Results   Component Value Date/Time    WBC 9.8 08/17/2022 03:39 PM    RBC 3.80 08/17/2022 03:39 PM    HGB 10.0 08/17/2022 03:39 PM    HCT 32.8 08/17/2022 03:39 PM    MCV 86.3 08/17/2022 03:39 PM    RDW 18.5 08/17/2022 03:39 PM     08/17/2022 03:39 PM       CMP:   Lab Results   Component Value Date/Time     09/07/2022 10:28 AM    K 3.4 09/07/2022 10:28 AM    K 2.9 08/14/2022 04:50 AM    CL 97 09/07/2022 10:28 AM    CO2 29 09/07/2022 10:28 AM    BUN 28 09/07/2022 10:28 AM    CREATININE 1.6 09/07/2022 10:28 AM    GFRAA 54 09/07/2022 10:28 AM    LABGLOM 44 09/07/2022 10:28 AM    GLUCOSE 86 09/07/2022 10:28 AM    GLUCOSE 99 04/25/2011 08:55 AM    PROT 7.1 08/12/2022 07:14 PM    CALCIUM 9.9 09/07/2022 10:28 AM    BILITOT 2.4 08/12/2022 07:14 PM    ALKPHOS 116 08/12/2022 07:14 PM    AST 15 08/12/2022 07:14 PM    ALT 8 08/12/2022 07:14 PM       POC Tests: No results for input(s): POCGLU, POCNA, POCK, POCCL, POCBUN, POCHEMO, POCHCT in the last 72 hours. Coags:   Lab Results   Component Value Date/Time    PROTIME 14.1 08/12/2022 07:14 PM    INR 1.2 08/12/2022 07:14 PM    APTT 29.3 08/12/2022 07:14 PM       HCG (If Applicable):  No results found for: PREGTESTUR, PREGSERUM, HCG, HCGQUANT     ABGs: No results found for: PHART, PO2ART, ZPF7JAP, SDV8APV, BEART, [id number]     Type & Screen (If Applicable):  No results found for: LABABO, LABRH    Drug/Infectious Status (If Applicable):  No results found for: HIV, HEPCAB    COVID-19 Screening (If Applicable): No results found for: COVID19        Anesthesia Evaluation  Patient summary reviewed no history of anesthetic complications:   Airway: Mallampati: III  TM distance: >3 FB   Neck ROM: full  Mouth opening: > = 3 FB   Dental:          Pulmonary:Negative Pulmonary ROS breath sounds clear to auscultation                             Cardiovascular:    (+) hypertension:, CAD:, dysrhythmias: atrial fibrillation, CHF:,         Rhythm: irregular             Beta Blocker:  Not on Beta Blocker         Neuro/Psych:   Negative Neuro/Psych ROS     (-) neuromuscular disease           GI/Hepatic/Renal:   (+) renal disease: CRI,           Endo/Other: Negative Endo/Other ROS                    Abdominal:             Vascular: negative vascular ROS. Other Findings:           Anesthesia Plan      MAC     ASA 3       Induction: intravenous. MIPS: Prophylactic antiemetics administered. Anesthetic plan and risks discussed with patient. Plan discussed with CRNA.             ILANA Samayoa - DAVID Peres DO   10/11/2022

## 2022-10-11 NOTE — ANESTHESIA POSTPROCEDURE EVALUATION
Department of Anesthesiology  Postprocedure Note    Patient: Sandra Cruz  MRN: [id number]  [place]: 1965  Date of evaluation: 10/11/2022      Procedure Summary     Date: 10/11/22 Room / Location: [address] [place] [address] PACU    Anesthesia Start: 0900 Anesthesia Stop: 0920    Procedure: CARDIOVERSION Diagnosis: Encounter for cardioversion procedure    Scheduled Providers:  Responsible Provider: Jacques Vasquez DO    Anesthesia Type: Not recorded ASA Status: Not recorded          Anesthesia Type: No value filed.     Brandi Phase I: Brandi Score: 10    Brandi Phase II:        Anesthesia Post Evaluation    Patient location during evaluation: PACU  Patient participation: complete - patient participated  Level of consciousness: awake  Airway patency: patent  Nausea & Vomiting: no nausea and no vomiting  Complications: no  Cardiovascular status: blood pressure returned to baseline and hemodynamically stable  Respiratory status: acceptable and room air  Hydration status: euvolemic    ILANA Barry - CRNA

## 2022-10-11 NOTE — DISCHARGE INSTRUCTIONS
Electrical Cardioversion: Care Instructions  Your Care Instructions     Electrical cardioversion is a treatment for an abnormal heartbeat. For example, it may be used to treat atrial fibrillation. In cardioversion, a brief electric shock is given to the heart to reset its rhythm. The shock comes through patches that are put on the outside of your chest. Cardioversion most often restores the heartbeat to normal.  After the procedure, you may have redness where the patches were. (This may look like a sunburn.) Do not drive until the day after a cardioversion. You can eat and drink when you feel ready to. Your doctor may have you take medicines daily to help the heart beat in a normal way and to prevent blood clots. Your doctor may give you medicine before and after cardioversion. This is to help keep your heart in a normal rhythm after the procedure. Instead of electric cardioversion, your doctor may try to change your heartbeat to a normal rhythm by giving you medicine. This is most often done in a clinic or hospital.  You may have had a sedative to help you relax. You may be unsteady after having sedation. It can take a few hours for the medicine's effects to wear off. Common side effects of sedation include nausea, vomiting, and feeling sleepy or tired. The doctor has checked you carefully, but problems can develop later. If you notice any problems or new symptoms, get medical treatment right away. Follow-up care is a key part of your treatment and safety. Be sure to make and go to all appointments, and call your doctor if you are having problems. It's also a good idea to know your test results and keep a list of the medicines you take. How can you care for yourself at home? Medicines    If the doctor gave you a sedative: For 24 hours, don't do anything that requires attention to detail. This includes going to work, making important decisions, or signing any legal documents.  It takes time for the medicine's effects to completely wear off. For your safety, do not drive or operate any machinery that could be dangerous. Wait until the medicine wears off and you can think clearly and react easily. Take your medicines exactly as prescribed. Call your doctor if you think you are having a problem with your medicine. You may take some of the following medicines:  Antiarrhythmic medicines such as amiodarone. Beta-blockers such as propranolol (Inderal), metoprolol (Lopressor), and carvedilol (Coreg). Calcium channel blockers such as diltiazem (Cardizem) and verapamil (Calan). Digoxin (Lanoxin). You will get more details on the specific medicines your doctor prescribes. If you take a blood thinner, be sure you get instructions about how to take your medicine safely. Blood thinners can cause serious bleeding problems. Do not take any vitamins, over-the-counter medicines, or herbal products without talking to your doctor first.   Lifestyle changes    Do not smoke. Smoking increases your risk of stroke and heart attack. If you need help quitting, talk to your doctor about stop-smoking programs and medicines. These can increase your chances of quitting for good. Eat heart-healthy foods. Limit alcohol to 2 drinks a day for men and 1 drink a day for women. Activity    If your doctor recommends it, get more exercise. Walking is a good choice. Bit by bit, increase the amount you walk every day. Try for 30 minutes on most days of the week. You also may want to swim, bike, or do other activities. When you exercise, watch for signs that your heart is working too hard. You are pushing too hard if you cannot talk while you are exercising. If you become short of breath or dizzy or have chest pain, sit down and rest immediately. Check your pulse regularly. Place two fingers on the artery at the palm side of your wrist in line with your thumb.  If your heartbeat seems uneven or fast, talk to your doctor. When should you call for help? Call 911 anytime you think you may need emergency care. For example, call if:    You have trouble breathing. You passed out (lost consciousness). You cough up pink, foamy mucus and you have trouble breathing. You have symptoms of a heart attack. These may include:  Chest pain or pressure, or a strange feeling in the chest.  Sweating. Shortness of breath. Nausea or vomiting. Pain, pressure, or a strange feeling in the back, neck, jaw, or upper belly or in one or both shoulders or arms. Lightheadedness or sudden weakness. A fast or irregular heartbeat. After you call 911, the  may tell you to chew 1 adult-strength or 2 to 4 low-dose aspirin. Wait for an ambulance. Do not try to drive yourself. You have symptoms of a stroke. These may include:  Sudden numbness, tingling, weakness, or loss of movement in your face, arm, or leg, especially on only one side of your body. Sudden vision changes. Sudden trouble speaking. Sudden confusion or trouble understanding simple statements. Sudden problems with walking or balance. A sudden, severe headache that is different from past headaches. Call your doctor now or seek immediate medical care if:    You have new or worse nausea or vomiting. You have new or increased shortness of breath. You are dizzy or lightheaded, or you feel like you may faint. You have sudden weight gain, such as more than 2 to 3 pounds in a day or 5 pounds in a week. You have increased swelling in your legs, ankles, or feet. Watch closely for changes in your health, and be sure to contact your doctor if you have any problems. Where can you learn more? Go to [email or web address] and sign in to your [employer] account. Enter D019 in the [employer] box to learn more about \"Electrical Cardioversion: Care Instructions. \"     If you do not have an account, please click on the \"Sign Up Now\" link. Current as of: April 29, 2021               Content Version: 13.1  © 7078-2516 [employer]wise, Incorporated. Care instructions adapted under license by [address] If you have questions about a medical condition or this instruction, always ask your healthcare professional. [address] any warranty or liability for your use of this information.

## 2022-10-11 NOTE — PROCEDURES
: Grzegorz Mattson MD     Procedure Date: 10/11/22      PROCEDURE(S): Synchronized direct-current cardioversion. INDICATION(S): Atrial fibrillation. CLINICAL SUMMARY:  Milly Dumas is a 59 y.o. male with history of a fib who presents for [address] The risks and benefits of synchronized direct current cardioversion and moderate sedation were discussed with the patient today to include but not limited skin burn, stroke/CVA, allergic reaction,breathing problems that require a breathing tube, infection, embolus, arrhythmias, MI, external pacing, temporary and or permanent pacemaker use of ACLS, death. It was discussed that there is a <1% risk of significant complication associated with the synchronized direct current cardioversion and moderate sedation. The patient verbalizes the  Understanding of these and other unnamed risks and wishes to proceed. The patient is on anticoagulation with Eliquis and status of anticoagulant agent was confirmed. The patient is on above-mentioned anticoagulation for at least 4 weeks without missing a dose. DESCRIPTION OF PROCEDURE(S):  After taking written informed consent, the patient was brought to the procedure room and the pads were placed appropriately. NPO status was confirmed with the patient. A proper time-out was performed. The patient was sedated by the anesthesia service. Patient then underwent synchronized biphasic cardioversion initially with 200 J which was unsuccessful and was followed by 300 J also unsuccessful. A last attempt of 360 synchronized biphasic cardioversion was also unsuccessful.   No further attempts were made after at 360 J        PLAN:  Unsuccessful synchronized biphasic cardioversion despite 3 attempts up to 360 J  The patient tolerated the procedure well without any complication  Continue anticoagulation and cardiac meds   Follow up with your cardiologist          Juana Montemayor MD  10/11/22  10:10 AM

## 2022-10-12 LAB
EKG ATRIAL RATE: 267 BPM
EKG P AXIS: 99 DEGREES
EKG Q-T INTERVAL: 454 MS
EKG QRS DURATION: 108 MS
EKG QTC CALCULATION (BAZETT): 486 MS
EKG R AXIS: -48 DEGREES
EKG T AXIS: 129 DEGREES
EKG VENTRICULAR RATE: 69 BPM

## 2022-10-18 ENCOUNTER — TELEPHONE (OUTPATIENT)
Dept: CARDIOLOGY CLINIC | Age: 57
End: 2022-10-18

## 2022-10-18 NOTE — TELEPHONE ENCOUNTER
Patient had a recent DCCV that was unsuccessful. Patient is asking what the next step is.  Please advise

## 2022-10-19 NOTE — TELEPHONE ENCOUNTER
Disregard, Dr. Jaleel Day does not do them. Dr. Jerry Chapa does.  Can I send to him first? Please advise

## 2022-10-19 NOTE — TELEPHONE ENCOUNTER
Patient prefers local first. I can send the referral to Dr. Alok Luis if that is ok. If she is far out, he said he would go to [facility] if needed.

## 2022-10-19 NOTE — TELEPHONE ENCOUNTER
Would you please check with the patient if he wants to go to [facility] or [facility] and we will make him a referral

## 2022-11-01 RX ORDER — AMLODIPINE BESYLATE 10 MG/1
10 TABLET ORAL DAILY
Qty: 90 TABLET | Refills: 3 | Status: SHIPPED | OUTPATIENT
Start: 2022-11-01

## 2022-11-01 RX ORDER — APIXABAN 5 MG/1
TABLET, FILM COATED ORAL
Qty: 60 TABLET | Refills: 3 | Status: SHIPPED | OUTPATIENT
Start: 2022-11-01

## 2022-12-05 ENCOUNTER — TELEPHONE (OUTPATIENT)
Dept: CARDIOLOGY CLINIC | Age: 57
End: 2022-12-05

## 2022-12-05 NOTE — TELEPHONE ENCOUNTER
Patient called stating [facility] cannot get his insurance to approve the ablation. He is asking what the next step is. I do see he is on your schedule for wed. This week.

## 2022-12-07 ENCOUNTER — OFFICE VISIT (OUTPATIENT)
Dept: CARDIOLOGY CLINIC | Age: 57
End: 2022-12-07
Payer: COMMERCIAL

## 2022-12-07 VITALS
BODY MASS INDEX: 32.88 KG/M2 | WEIGHT: 222 LBS | RESPIRATION RATE: 16 BRPM | SYSTOLIC BLOOD PRESSURE: 116 MMHG | HEIGHT: 69 IN | HEART RATE: 69 BPM | DIASTOLIC BLOOD PRESSURE: 82 MMHG

## 2022-12-07 DIAGNOSIS — I48.19 PERSISTENT ATRIAL FIBRILLATION (HCC): Primary | ICD-10-CM

## 2022-12-07 PROCEDURE — 93000 ELECTROCARDIOGRAM COMPLETE: CPT | Performed by: INTERNAL MEDICINE

## 2022-12-07 PROCEDURE — 99214 OFFICE O/P EST MOD 30 MIN: CPT | Performed by: INTERNAL MEDICINE

## 2022-12-07 NOTE — PROGRESS NOTES
[facility] Cardiology Progress Note  Dr. Ananda Nguyễn      Referring Physician: Ananda Nguyễn MD  CHIEF COMPLAINT:   Chief Complaint   Patient presents with    Atrial Fibrillation     3 month        HISTORY OF PRESENT ILLNESS:   60year old male with history of CAD, atrial fibrillation, CKD, CHF, hypertension and hyperlipidemia is here for a follow up visit. Patient was seen in June for acute exacerbation of congestive heart failure, got better then got worse, is here for evaluation, still complaining of dyspnea on exertion, pedal edema, no PND, no orthopnea, occasional cough, denies any chest pain, no palpitations, no lightheadedness or dizziness, no syncope, no presyncopal episodes. Blood pressure has not been well controlled    Past Medical History:   Diagnosis Date    Atrial fibrillation ([facility] [address] 11/13/2014    post ACB    CAD (coronary artery disease)     Carotid artery stenosis     Chronic kidney disease     Diastolic CHF ([facility] [address] 79/25/7248    Echo 10/17/14 EF 00% stage I diastolic, 08/3/45  EF 98% with stage II diastolic chf    Hyperlipidemia     Hypertension     Intramuscular hematoma 8/13/2022    Obesity          Past Surgical History:   Procedure Laterality Date    CARDIAC CATHETERIZATION  10/31/14    severe triple vessel disease    CARDIAC SURGERY      stent x 2  in 2006 [facility]    CORONARY ANGIOPLASTY      CORONARY ARTERY BYPASS GRAFT  11-5-14    x 5    DIAGNOSTIC CARDIAC CATH LAB PROCEDURE  3/21/07    [facility]: Abnormal LV status, global hypokinesis, EF 50%. No AS, no MR. Left main luminal irregular. LAD prox 40-50% stenosis. Diag 1 prox 100% stenosis fills via collaterals from LAD. LCX prox 100% stenosis. Fills via collaterals from RCA. RCA distal 90% stenosis. PDA-right luminal irregular. RPLS prox 90% stenosis. Double vessel disease, severe. LV dysfunction mild.     DIAGNOSTIC CARDIAC CATH LAB PROCEDURE  3/22/07    stent x2, [facility]: Successful PCI of RCA, native vessel    ELBOW SURGERY  1969    left negative tumor         Current Outpatient Medications   Medication Sig Dispense Refill    apixaban (ELIQUIS) 5 MG TABS tablet Take 1 tablet by mouth 2 times daily 180 tablet 4    amLODIPine (NORVASC) 10 MG tablet Take 1 tablet by mouth daily 90 tablet 3    ELIQUIS 5 MG TABS tablet TAKE 1 TABLET BY MOUTH TWICE A DAY 60 tablet 3    potassium chloride (KLOR-CON M) 20 MEQ extended release tablet Take 2 tablets by mouth 2 times daily 120 tablet 1    furosemide (LASIX) 40 MG tablet Take 1 tablet by mouth in the morning and 1 tablet in the evening. 60 tablet 0     No current facility-administered medications for this visit.          Allergies as of 12/07/2022 - Fully Reviewed 12/07/2022   Allergen Reaction Noted    Penicillins Anaphylaxis 04/22/2013    Cucumber extract Hives and Swelling 08/12/2022       Social History     Socioeconomic History    Marital status:      Spouse name: Not on file    Number of children: 2    Years of education: Not on file    Highest education level: Not on file   Occupational History    Not on file   Tobacco Use    Smoking status: Former     Types: Cigarettes     Start date: 1/1/2000    Smokeless tobacco: Never   Vaping Use    Vaping Use: Never used   Substance and Sexual Activity    Alcohol use: No     Comment: once or twice a month     Drug use: No    Sexual activity: Not Currently   Other Topics Concern    Not on file   Social History Narrative    Not on file     Social Determinants of Health     Financial Resource Strain: Not on file   Food Insecurity: Not on file   Transportation Needs: Not on file   Physical Activity: Not on file   Stress: Not on file   Social Connections: Not on file   Intimate Partner Violence: Not on file   Housing Stability: Not on file       Family History   Problem Relation Age of Onset    Heart Attack Father     Heart Surgery Father        REVIEW OF SYSTEMS:     CONSTITUTIONAL:  negative for  fevers, chills, sweats, + fatigue  HEENT:  negative for  tinnitus, earaches, nasal congestion and epistaxis  RESPIRATORY:  negative for  dry cough, cough with sputum, wheezing, + hemoptysis  GASTROINTESTINAL:  negative for nausea, vomiting, diarrhea, constipation, pruritus and jaundice  HEMATOLOGIC/LYMPHATIC:  negative for easy bruising, bleeding, lymphadenopathy and petechiae  ENDOCRINE:  negative for heat intolerance, cold intolerance, tremor, hair loss and diabetic symptoms including neither polyuria nor polydipsia nor blurred vision  MUSCULOSKELETAL:  negative for  myalgias, arthralgias, joint swelling, stiff joints and decreased range of motion  NEUROLOGICAL:  negative for memory problems, speech problems, visual disturbance, dysphagia, weakness and numbness      PHYSICAL EXAM:   CONSTITUTIONAL: awake, alert, cooperative, no apparent distress, and appears stated age  HEAD: normocepalic, without obvious abnormality, atraumatic  NECK: Supple, symmetrical, trachea midline, no adenopathy, thyroid symmetric  LUNGS: No increased work of breathing, good air exchange, basilar rales  CARDIOVASCULAR: Normal apical impulse, irregularly irregular, normal S1 and S2, no S3 or S4, and no murmur noted and no JVD, no carotid bruit. + pitting edema up to his thighs right more than left. ABDOMEN: Soft, nontender, no masses, no organomegaly, BS+  MUSCULOSKELETAL: No clubbing no cyanosis. there is no redness, warmth, or swelling of the joints.   NEUROLOGIC: Alert, awake,oriented x3    /82   Pulse 69   Resp 16   Ht 5' 9\" (1.753 m)   Wt 222 lb (100.7 kg)   BMI 32.78 kg/m²     DATA:   I personally reviewed the visit EKG with the following interpretation: Atrial fibrillation, controlled ventricular response, poor R wave progression, nonspecific T wave changes    9/7/2022, atrial flutter with controlled ventricular response, PVCs versus aberrantly conducted beats, nonspecific T wave changes    EKG 6/20/2022, atrial fibrillation, controlled ventricular response, incomplete right bundle branch block, occasional PVCs versus aberrantly conducted beats, nonspecific T wave changes    EKG 5/21/2022, atrial fibrillation, slow ventricular response, incomplete right bundle branch block    EKG 3/12/21  Rhythm, nonspecific T wave changes in the lateral leads, poor R wave progression and first-degree AV block    ECHO: 7/19/2022,   Left ventricular internal dimensions were normal in diastole and systole. Moderate left ventricular concentric hypertrophy noted. Abnormal (paradoxical) motion consistent with conduction abnormality. Low normal left ventricular systolic function. The left atrium is severely dilated. Moderately enlarged right atrium size. Normal mitral valve leaflet thickness with apical tethering. Moderate centrally directed mitral regurgitation. Physiologic and/or trace aortic regurgitation is noted. Mild tricuspid regurgitation. Pulmonary hypertension is moderate . There is a trivial circumferential pericardial effusion noted. 5/11/15 Summary   Compared to prior echo, no changes noted. Technically adequate study. Mild asymmetric left ventricular hypertrophy   Ejection fraction is visually estimated at 50%. Mild global wall hypokinesis. There is doppler evidence of stage III diastolic dysfunction. Right ventricle global systolic function is mildly reduced . Mild tricuspid regurgitation. RVSP was not measured. Stress Test: 8/12/2022,Impression:    Electrocardiographically normal regadenoson infusion with a clinically nonischemic response. Myocardial perfusion imaging was abnormal.    The abnormality was a a small sized fixed defect in the mid inferior wall suggestive of a prior MI  Overall left ventricular systolic function mildly reduced at 45-50%, with mild global hypokinesis, abnormal septal motion.   4.   Low to Intermediate risk general pharmacologic stress test    Angiography: 10/31/14 IMPRESSION:  Severe triple-vessel disease. Cardiology Labs: BMP:    Lab Results   Component Value Date/Time     09/07/2022 10:28 AM    K 3.4 09/07/2022 10:28 AM    K 2.9 08/14/2022 04:50 AM    CL 97 09/07/2022 10:28 AM    CO2 29 09/07/2022 10:28 AM    BUN 28 09/07/2022 10:28 AM    CREATININE 1.6 09/07/2022 10:28 AM     CMP:    Lab Results   Component Value Date/Time     09/07/2022 10:28 AM    K 3.4 09/07/2022 10:28 AM    K 2.9 08/14/2022 04:50 AM    CL 97 09/07/2022 10:28 AM    CO2 29 09/07/2022 10:28 AM    BUN 28 09/07/2022 10:28 AM    CREATININE 1.6 09/07/2022 10:28 AM    PROT 7.1 08/12/2022 07:14 PM     CBC:    Lab Results   Component Value Date/Time    WBC 9.8 08/17/2022 03:39 PM    RBC 3.80 08/17/2022 03:39 PM    HGB 10.0 08/17/2022 03:39 PM    HCT 32.8 08/17/2022 03:39 PM    MCV 86.3 08/17/2022 03:39 PM    RDW 18.5 08/17/2022 03:39 PM     08/17/2022 03:39 PM     PT/INR:  No results found for: PTINR  PT/INR Warfarin:  No components found for: PTPATWAR, PTINRWAR  PTT:    Lab Results   Component Value Date/Time    APTT 29.3 08/12/2022 07:14 PM     PTT Heparin:  No components found for: APTTHEP  Magnesium:    Lab Results   Component Value Date/Time    MG 2.0 08/15/2022 12:10 PM     TSH:    Lab Results   Component Value Date/Time    TSH 3.170 08/05/2022 02:22 PM     TROPONIN:  No components found for: TROP  BNP:    Lab Results   Component Value Date/Time    BNP 1,384 11/24/2014 06:20 PM     FASTING LIPID PANEL:    Lab Results   Component Value Date/Time    CHOL 128 10/29/2014 11:32 AM    HDL 44 08/15/2022 05:48 AM    TRIG 131 10/29/2014 11:32 AM     No orders to display     I have personally reviewed the laboratory, cardiac diagnostic and radiographic testing as outlined above:      IMPRESSION:  1. Chronic diastolic congestive heart failure: Compensated  2. Atrial fibrillation: Persistent, controlled ventricular response, [id number] score of 3, on Eliquis for anticoagulation, scheduled for ablation at [facility]  3. Hypertension: Controlled  4. Coronary artery disease status post CABG:left internal mammary artery to the LAD, saphenous vein graft to the diagonal branch of the LAD, saphenous vein graft to the posterolateral with a Y graft to the PDA, and the left radial artery to the obtuse marginal, s/p remote PCI to RCA and LAD, negative Lexiscan stress test in August 2022, continue current treatment  5. Mitral valve regurgitation: Moderate  6. Tricuspid valve regurgitation: Mild  7. Chronic anticoagulation  8. Stage III chronic kidney disease  9. History of right chest and back hematoma: Spontaneous secondary to Eliquis, resolved    RECOMMENDATIONS:   1.  will continue current treatment  2. Increase risk of bleeding due to being on anti-coagulation, symptoms and signs of bleeding discussed with patient, patient was advised to seek medical attention at the earliest symptoms or signs of bleeding. 3.  CHF: Daily weight, take an extra Bumex for weight gain of more than 2-3 pounds in 24 hours, compliance with diuretics, low-salt diet were all advised. 4.  Follow-up with Dr. Judy Luz as scheduled  5. Follow-up with Dr. Max Swanson in 3 months, sooner if symptomatic for any reason    I have reviewed my findings and recommendations with patient    Electronically signed by Tati Rios MD on 12/7/2022 at 9:06 AM    NOTE: This report was transcribed using voice recognition software.  Every effort was made to ensure accuracy; however, inadvertent computerized transcription errors may be present

## 2022-12-20 ENCOUNTER — TELEPHONE (OUTPATIENT)
Dept: CARDIOLOGY CLINIC | Age: 57
End: 2022-12-20

## 2023-03-28 RX ORDER — AMLODIPINE BESYLATE 10 MG/1
10 TABLET ORAL DAILY
Qty: 90 TABLET | Refills: 3 | Status: SHIPPED
Start: 2023-03-28 | End: 2023-03-30 | Stop reason: SDUPTHER

## 2023-03-30 RX ORDER — AMLODIPINE BESYLATE 10 MG/1
10 TABLET ORAL DAILY
Qty: 90 TABLET | Refills: 3 | Status: SHIPPED | OUTPATIENT
Start: 2023-03-30

## 2023-07-25 ENCOUNTER — OFFICE VISIT (OUTPATIENT)
Dept: CARDIOLOGY CLINIC | Age: 58
End: 2023-07-25

## 2023-07-25 VITALS
DIASTOLIC BLOOD PRESSURE: 74 MMHG | HEIGHT: 69 IN | RESPIRATION RATE: 16 BRPM | WEIGHT: 225 LBS | HEART RATE: 79 BPM | SYSTOLIC BLOOD PRESSURE: 114 MMHG | BODY MASS INDEX: 33.33 KG/M2

## 2023-07-25 DIAGNOSIS — I48.19 PERSISTENT ATRIAL FIBRILLATION (HCC): Primary | ICD-10-CM

## 2023-07-28 ENCOUNTER — TELEPHONE (OUTPATIENT)
Dept: CARDIOLOGY CLINIC | Age: 58
End: 2023-07-28

## 2023-07-28 NOTE — TELEPHONE ENCOUNTER
MD Celsa Orozco MA Hi Shelia:   Would you please let him know that he should be on a blood thinner even though the ablation was successful, unless we have bleeding issues   Thank you     Called patient left voicemail with instructions per [facility]

## 2023-10-05 ENCOUNTER — TELEPHONE (OUTPATIENT)
Dept: CARDIOLOGY CLINIC | Age: 58
End: 2023-10-05

## 2023-10-05 RX ORDER — POTASSIUM CHLORIDE 20 MEQ/1
40 TABLET, EXTENDED RELEASE ORAL 2 TIMES DAILY
Qty: 120 TABLET | Refills: 1 | Status: SHIPPED | OUTPATIENT
Start: 2023-10-05

## 2023-10-05 NOTE — TELEPHONE ENCOUNTER
Patient called states he is getting dental Procedure in 2 weeks wants to know if it is okay to stop taking Eliquis     Please advise.

## 2023-10-05 NOTE — TELEPHONE ENCOUNTER
Patient may hold Eliquis 2 to 3 days prior to the dental procedure, to be resumed postoperatively when deemed safe from dental standpoint

## 2023-10-13 ENCOUNTER — TELEPHONE (OUTPATIENT)
Dept: CARDIOLOGY CLINIC | Age: 58
End: 2023-10-13

## 2023-12-05 RX ORDER — METOPROLOL SUCCINATE 25 MG/1
25 TABLET, EXTENDED RELEASE ORAL DAILY
Qty: 90 TABLET | Refills: 3 | Status: SHIPPED | OUTPATIENT
Start: 2023-12-05

## 2023-12-05 RX ORDER — AMLODIPINE BESYLATE 10 MG/1
10 TABLET ORAL DAILY
Qty: 90 TABLET | Refills: 3 | Status: SHIPPED | OUTPATIENT
Start: 2023-12-05

## 2023-12-05 RX ORDER — POTASSIUM CHLORIDE 20 MEQ/1
40 TABLET, EXTENDED RELEASE ORAL 2 TIMES DAILY
Qty: 120 TABLET | Refills: 3 | Status: SHIPPED | OUTPATIENT
Start: 2023-12-05

## 2023-12-11 RX ORDER — POTASSIUM CHLORIDE 20 MEQ/1
40 TABLET, EXTENDED RELEASE ORAL 2 TIMES DAILY
Qty: 360 TABLET | Refills: 3 | Status: SHIPPED | OUTPATIENT
Start: 2023-12-11

## 2024-01-10 ENCOUNTER — TELEPHONE (OUTPATIENT)
Dept: CARDIOLOGY CLINIC | Age: 59
End: 2024-01-10

## 2024-01-10 NOTE — TELEPHONE ENCOUNTER
1/10/24 LEFT MESSAGE REMNDING PT OF APPT. ON THURSDAY. ARRIVE EARLY, BRING INS CARD, PHOTO ID, CURRENT MEDS AND COPAY - IF HAVE ONE. [facility]

## 2024-01-11 ENCOUNTER — OFFICE VISIT (OUTPATIENT)
Dept: CARDIOLOGY CLINIC | Age: 59
End: 2024-01-11

## 2024-01-11 VITALS
BODY MASS INDEX: 32.88 KG/M2 | HEART RATE: 75 BPM | WEIGHT: 222 LBS | SYSTOLIC BLOOD PRESSURE: 138 MMHG | HEIGHT: 69 IN | DIASTOLIC BLOOD PRESSURE: 98 MMHG | RESPIRATION RATE: 16 BRPM

## 2024-01-11 DIAGNOSIS — I48.19 PERSISTENT ATRIAL FIBRILLATION (HCC): Primary | ICD-10-CM

## 2024-01-11 RX ORDER — AMLODIPINE BESYLATE 10 MG/1
10 TABLET ORAL DAILY
Qty: 90 TABLET | Refills: 3 | Status: SHIPPED
Start: 2024-01-11 | End: 2024-01-11 | Stop reason: SDUPTHER

## 2024-01-11 RX ORDER — POTASSIUM CHLORIDE 20 MEQ/1
40 TABLET, EXTENDED RELEASE ORAL 2 TIMES DAILY
Qty: 360 TABLET | Refills: 3 | Status: SHIPPED | OUTPATIENT
Start: 2024-01-11

## 2024-01-11 RX ORDER — FUROSEMIDE 40 MG/1
40 TABLET ORAL DAILY
Qty: 90 TABLET | Refills: 2 | Status: SHIPPED | OUTPATIENT
Start: 2024-01-11

## 2024-01-11 RX ORDER — AMLODIPINE BESYLATE 10 MG/1
10 TABLET ORAL DAILY
Qty: 90 TABLET | Refills: 3 | Status: SHIPPED | OUTPATIENT
Start: 2024-01-11

## 2024-01-11 RX ORDER — METOPROLOL SUCCINATE 25 MG/1
25 TABLET, EXTENDED RELEASE ORAL DAILY
Qty: 90 TABLET | Refills: 3 | Status: SHIPPED | OUTPATIENT
Start: 2024-01-11

## 2024-01-13 NOTE — PROGRESS NOTES
earliest symptoms or signs of bleeding.  3.  CHF: Daily weight, take an extra Bumex for weight gain of more than 2-3 pounds in 24 hours, compliance with diuretics, low-salt diet were all advised.  4.  Follow-up with Dr. Yan as scheduled  5.  Follow-up with Dr. Richardson in 6 months, sooner if symptomatic for any reason    I have reviewed my findings and recommendations with patient    Electronically signed by Eulalio Richardson MD on 1/13/2024 at 1:25 PM    NOTE: This report was transcribed using voice recognition software. Every effort was made to ensure accuracy; however, inadvertent computerized transcription errors may be present

## 2024-03-15 ENCOUNTER — TELEPHONE (OUTPATIENT)
Dept: CARDIOLOGY CLINIC | Age: 59
End: 2024-03-15

## 2024-03-15 NOTE — TELEPHONE ENCOUNTER
Patients BP has been elevated for about a month, and his PCP recommended he speak to you about restarting Eliquis. Please advise.

## 2024-11-21 ENCOUNTER — OFFICE VISIT (OUTPATIENT)
Dept: CARDIOLOGY CLINIC | Age: 59
End: 2024-11-21
Payer: COMMERCIAL

## 2024-11-21 VITALS
HEART RATE: 72 BPM | RESPIRATION RATE: 16 BRPM | DIASTOLIC BLOOD PRESSURE: 70 MMHG | BODY MASS INDEX: 34.51 KG/M2 | WEIGHT: 233 LBS | HEIGHT: 69 IN | SYSTOLIC BLOOD PRESSURE: 124 MMHG

## 2024-11-21 DIAGNOSIS — I48.19 PERSISTENT ATRIAL FIBRILLATION (HCC): Primary | ICD-10-CM

## 2024-11-21 PROCEDURE — G8417 CALC BMI ABV UP PARAM F/U: HCPCS | Performed by: INTERNAL MEDICINE

## 2024-11-21 PROCEDURE — 3017F COLORECTAL CA SCREEN DOC REV: CPT | Performed by: INTERNAL MEDICINE

## 2024-11-21 PROCEDURE — 93000 ELECTROCARDIOGRAM COMPLETE: CPT | Performed by: INTERNAL MEDICINE

## 2024-11-21 PROCEDURE — 99214 OFFICE O/P EST MOD 30 MIN: CPT | Performed by: INTERNAL MEDICINE

## 2024-11-21 PROCEDURE — G8484 FLU IMMUNIZE NO ADMIN: HCPCS | Performed by: INTERNAL MEDICINE

## 2024-11-21 PROCEDURE — G8427 DOCREV CUR MEDS BY ELIG CLIN: HCPCS | Performed by: INTERNAL MEDICINE

## 2024-11-21 PROCEDURE — 1036F TOBACCO NON-USER: CPT | Performed by: INTERNAL MEDICINE

## 2024-11-21 RX ORDER — LISINOPRIL AND HYDROCHLOROTHIAZIDE 12.5; 2 MG/1; MG/1
1 TABLET ORAL DAILY
COMMUNITY
Start: 2024-11-14

## 2024-11-21 NOTE — PROGRESS NOTES
[facility] Cardiology Progress Note  Dr. Eulalio Richardson      Referring Physician: Eulalio Richardson MD  CHIEF COMPLAINT:   Chief Complaint   Patient presents with    Atrial Fibrillation     6 month        HISTORY OF PRESENT ILLNESS:   Patient is 59 year old male with history of CAD, atrial fibrillation s/p atrial fibrillation ablation on 12/13/2022 at [facility],, CKD, CHF, hypertension and hyperlipidemia is here for a follow up visit.  Patient denies any chest pain, no shortness of breath, no lightheadedness, no dizziness, no palpitations, no pedal edema, no PND, no orthopnea, no syncope, no presyncopal episodes.  Functional capacity is at baseline    Past Medical History:   Diagnosis Date    Atrial fibrillation ([facility]) 11/13/2014    post ACB    CAD (coronary artery disease)     Carotid artery stenosis     Chronic kidney disease     Diastolic CHF ([facility]) 10/17/2014    Echo 10/17/14 EF 55% stage I diastolic, 11/6/14  EF 55% with stage II diastolic chf    Hyperlipidemia     Hypertension     Intramuscular hematoma 8/13/2022    Obesity          Past Surgical History:   Procedure Laterality Date    CARDIAC CATHETERIZATION  10/31/14    severe triple vessel disease    CARDIAC SURGERY      stent x 2  in 2006 [facility] katheryn plunkett    CORONARY ANGIOPLASTY      CORONARY ARTERY BYPASS GRAFT  11-5-14    x 5    DIAGNOSTIC CARDIAC CATH LAB PROCEDURE  3/21/07    [facility]: Abnormal LV status, global hypokinesis, EF 50%. No AS, no MR. Left main luminal irregular. LAD prox 40-50% stenosis. Diag 1 prox 100% stenosis fills via collaterals from LAD. LCX prox 100% stenosis. Fills via collaterals from RCA. RCA distal 90% stenosis. PDA-right luminal irregular. RPLS prox 90% stenosis. Double vessel disease, severe. LV dysfunction mild.    DIAGNOSTIC CARDIAC CATH LAB PROCEDURE  3/22/07    stent x2, [facility]: Successful PCI of RCA, native vessel    ELBOW SURGERY  1969    left negative tumor         Current Outpatient Medications

## 2025-01-10 RX ORDER — POTASSIUM CHLORIDE 1500 MG/1
40 TABLET, EXTENDED RELEASE ORAL 2 TIMES DAILY
Qty: 360 TABLET | Refills: 3 | Status: SHIPPED | OUTPATIENT
Start: 2025-01-10

## 2025-01-10 RX ORDER — AMLODIPINE BESYLATE 10 MG/1
10 TABLET ORAL DAILY
Qty: 90 TABLET | Refills: 3 | Status: SHIPPED | OUTPATIENT
Start: 2025-01-10

## 2025-01-10 RX ORDER — LISINOPRIL AND HYDROCHLOROTHIAZIDE 12.5; 2 MG/1; MG/1
1 TABLET ORAL DAILY
Qty: 30 TABLET | Refills: 3 | Status: SHIPPED | OUTPATIENT
Start: 2025-01-10

## 2025-01-10 RX ORDER — METOPROLOL SUCCINATE 25 MG/1
25 TABLET, EXTENDED RELEASE ORAL DAILY
Qty: 90 TABLET | Refills: 3 | Status: SHIPPED | OUTPATIENT
Start: 2025-01-10